# Patient Record
Sex: FEMALE | Race: WHITE | Employment: FULL TIME | URBAN - METROPOLITAN AREA
[De-identification: names, ages, dates, MRNs, and addresses within clinical notes are randomized per-mention and may not be internally consistent; named-entity substitution may affect disease eponyms.]

---

## 2018-04-25 ENCOUNTER — TELEPHONE (OUTPATIENT)
Dept: OTHER | Facility: CLINIC | Age: 53
End: 2018-04-25

## 2018-04-25 NOTE — TELEPHONE ENCOUNTER
4/25/2018    Call Regarding Onboarding are Choices    Attempt 1    Message on voicemail     Comments: 1 adult dep    Outreach   SV

## 2018-09-25 ENCOUNTER — ALLIED HEALTH/NURSE VISIT (OUTPATIENT)
Dept: NURSING | Facility: CLINIC | Age: 53
End: 2018-09-25
Payer: COMMERCIAL

## 2018-09-25 DIAGNOSIS — Z23 NEED FOR PROPHYLACTIC VACCINATION AND INOCULATION AGAINST INFLUENZA: Primary | ICD-10-CM

## 2018-09-25 PROCEDURE — 90682 RIV4 VACC RECOMBINANT DNA IM: CPT

## 2018-09-25 PROCEDURE — 90471 IMMUNIZATION ADMIN: CPT

## 2018-09-25 NOTE — NURSING NOTE
Prior to injection verified patient identity using patient's name and date of birth.  Due to injection administration, patient instructed to remain in clinic for 15 minutes  afterwards, and to report any adverse reaction to me immediately.  Meme Hernandez M.A.

## 2018-09-25 NOTE — PROGRESS NOTES

## 2018-09-25 NOTE — MR AVS SNAPSHOT
"              After Visit Summary   2018    Annemarie Prajapati    MRN: [id number]           Patient Information     Date Of Birth          1965        Visit Information        Provider Department      2018 10:00 AM CHERYL STATON/LPN [facility]        Today's Diagnoses     Need for prophylactic vaccination and inoculation against influenza    -  1       Follow-ups after your visit        Who to contact     If you have questions or need follow up information about today's clinic visit or your schedule please contact [facility] directly at [phone].  Normal or non-critical lab and imaging results will be communicated to you by iPerceptionshart, letter or phone within 4 business days after the clinic has received the results. If you do not hear from us within 7 days, please contact the clinic through Bitiumt or phone. If you have a critical or abnormal lab result, we will notify you by phone as soon as possible.  Submit refill requests through Unityware or call your pharmacy and they will forward the refill request to us. Please allow 3 business days for your refill to be completed.          Additional Information About Your Visit        MyChart Information     Unityware lets you send messages to your doctor, view your test results, renew your prescriptions, schedule appointments and more. To sign up, go to [email or web address] . Click on \"Log in\" on the left side of the screen, which will take you to the Welcome page. Then click on \"Sign up Now\" on the right side of the page.     You will be asked to enter the access code listed below, as well as some personal information. Please follow the directions to create your username and password.     Your access code is: [email or web address]  Expires: 2018 10:22 AM     Your access code will  in 90 days. If you need help or a new code, please call your [facility] or [phone].        Care EveryWhere ID     This is your Care " EveryWhere ID. This could be used by other organizations to access your [facility] medical records  [id number]         Blood Pressure from Last 3 Encounters:   10/28/16 122/76   10/21/16 129/79   05/19/14 136/72    Weight from Last 3 Encounters:   10/28/16 180 lb (81.6 kg)   10/21/16 186 lb (84.4 kg)   05/19/14 206 lb (93.4 kg)              We Performed the Following     FLU VACCINE, (RIV4) RECOMBINANT HA  , IM (FluBlok, egg free) [67550]- >18 YRS (FMG recommended  50-64 YRS)     Vaccine Administration, Initial [53998]        Primary Care Provider Office Phone # Fax #    Glenny Chou [phone]       [address] [address]        Equal Access to Services     NANCY BERNAL : Hadii moises todd hadasho Soomaali, waaxda luqadaha, qaybta kaalmada adeegyada, danitza donahue hayriley ennis . So [facility] [phone].    ATENCIÓN: Si habla español, tiene a ray disposición servicios gratuitos de asistencia lingüística. [place] al [phone].    We comply with applicable federal civil rights laws and [place] laws. We do not discriminate on the basis of race, color, national origin, age, disability, sex, sexual orientation, or gender identity.            Thank you!     Thank you for choosing [facility]  for your care. Our goal is always to provide you with excellent care. Hearing back from our patients is one way we can continue to improve our services. Please take a few minutes to complete the written survey that you may receive in the mail after your visit with us. Thank you!             Your Updated Medication List - Protect others around you: Learn how to safely use, store and throw away your medicines at www.disposemymeds.org.          This list is accurate as of 9/25/18 10:22 AM.  Always use your most recent med list.                   Brand Name Dispense Instructions for use Diagnosis    ACETAMINOPHEN 8 HOUR 650 MG 8 hour tablet    Generic drug:  acetaminophen     100 tablet    Take 325-650 mg by mouth every 6 hours as needed for pain.    S/P knee replacement       amitriptyline 10 MG tablet    ELAVIL     TAKE 1 TO 3 TABLETS BY MOUTH EVERY EVENING        aspirin-acetaminophen-caffeine 250-250-65 MG per tablet    EXCEDRIN MIGRAINE     Take 1 tablet by mouth every 6 hours as needed.        BUPROPION HCL PO           CELEBREX PO      Take 200 mg by mouth daily.        hydrocortisone 2.5 % cream     30 g    Apply  topically 3 times daily as needed (pruritic rash ).    Contact dermatitis       oxyCODONE-acetaminophen 5-325 MG per tablet    PERCOCET    15 tablet    Take 1-2 tablets by mouth every 4 hours as needed for pain        senna-docusate 8.6-50 MG per tablet    SENOKOT-S;PERICOLACE    60 tablet    Take 1-4 tablets by mouth 2 times daily.    S/P knee replacement       traMADol 50 MG tablet    ULTRAM    90 tablet    Take 1 tablet by mouth every 8 hours as needed.    S/P knee replacement       ZOFRAN PO      Take 8 mg by mouth every 8 hours as needed. Prn nausea and vomiting

## 2019-01-22 ENCOUNTER — THERAPY VISIT (OUTPATIENT)
Dept: PHYSICAL THERAPY | Facility: CLINIC | Age: 54
End: 2019-01-22
Payer: COMMERCIAL

## 2019-01-22 DIAGNOSIS — M54.50 LUMBOSACRAL PAIN: ICD-10-CM

## 2019-01-22 PROCEDURE — 97161 PT EVAL LOW COMPLEX 20 MIN: CPT | Mod: GP | Performed by: PHYSICAL THERAPIST

## 2019-01-22 PROCEDURE — 97110 THERAPEUTIC EXERCISES: CPT | Mod: GP | Performed by: PHYSICAL THERAPIST

## 2019-01-22 NOTE — PROGRESS NOTES
[facility] Initial Evaluation  Subjective:  History of lumbosacral pain for seventeen years secondary to SI instability during pregnancy. Pt has had intermittent pain since. 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 Pain has increased in the past month secondary to lifting, pushing and pulling during a rotation for her Occupational Therapy program. Pt referred by MD for physical therapy on 1-14-19      The history is provided by the patient. No  was used.   Annemarie Prajapati is a 53 year old female with a lumbar condition.  Condition occurred with:  Other reason.  Condition occurred: in the community and other.  This is a chronic condition     Patient reports pain:  Lumbar spine right and lumbar spine left.  Radiates to:  Gluteals right, knee right, lower leg right and thigh right (right L5 dermatome).  Pain is described as sharp and is intermittent and reported as 6/10.  Associated symptoms:  Loss of motion/stiffness and loss of strength. Pain is worse in the A.M..  Symptoms are exacerbated by sitting, standing, walking, lifting, carrying and bending and relieved by ice, heat, muscle relaxants and NSAID's (stretching).    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  Previous treatment includes chiropractic.  There was mild improvement following previous treatment.    Pertinent medical history includes:  Osteoarthritis and overweight.  Medical allergies: no.  Other surgeries include:  Orthopedic surgery (TKA x 2 , 2013).  Current medications:  Anti-depressants, muscle relaxants and sleep medication.    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  Primary job tasks include:  Prolonged sitting, prolonged standing and lifting.    Barriers include:  None as reported by the patient.    Red flags:  None as reported by  the patient.                        Objective:  Standing Alignment:        Lumbar deviations alignment: increased lordosis.                Flexibility/Screens:       Lower Extremity:  Decreased left lower extremity flexibility:Hip Flexors    Decreased right lower extremity flexibility:  Hip Flexors  Spine:  Decreased left spine flexibility:  Quadratus Lumborum    Decreased right spine flexibility:  Quadratus Lumborum             Lumbar/SI Evaluation  ROM:    AROM Lumbar:   Flexion:          Mod loss  Ext:                    Max loss pain    Side Bend:        Left:  Mod loss pain    Right:  Mod loss pain  Rotation:           Left:     Right:   Side Glide:        Left:     Right:         Strength: weak pelvic stabilizers and lower abdominals  Lumbar Myotomes:  normal            Lumbar DTR's:  normal        Lumbar Dermtomes:  normal                Neural Tension/Mobility:      Left side:SLR  negative.     Right side:   SLR  negative.   Lumbar Palpation:  Palpation (lumbar): point tenderness L3-L5 spinous processes and bilateral sacral sulci.            SI joint/Sacrum:    Pelvic base even, negative John Paul's                                                       General     ROS    Assessment/Plan:    Patient is a 53 year old female with lumbar and sacral complaints.    Patient has the following significant findings with corresponding treatment plan.                Diagnosis 1:  Lumbosacral pain  Pain -  hot/cold therapy, self management, education, home program and manual therapy as indicated  Decreased ROM/flexibility - manual therapy, therapeutic exercise, therapeutic activity and home program  Decreased strength - therapeutic exercise, therapeutic activities and home program    Therapy Evaluation Codes:   1) History comprised of:   Personal factors that impact the plan of care:      Time since onset of symptoms.    Comorbidity factors that impact the plan of care are:      Osteoarthritis and Overweight.     Medications  impacting care: Anti-depressant, Muscle relaxant and Sleep.  2) Examination of Body Systems comprised of:   Body structures and functions that impact the plan of care:      Lumbar spine and Sacral illiac joint.   Activity limitations that impact the plan of care are:      Bathing, Dressing, Lifting, Sitting, Standing and Sleeping.  3) Clinical presentation characteristics are:   Stable/Uncomplicated.  4) Decision-Making    Low complexity using standardized patient assessment instrument and/or measureable assessment of functional outcome.  Cumulative Therapy Evaluation is: Low complexity.    Previous and current functional limitations:  (See Goal Flow Sheet for this information)    Short term and Long term goals: (See Goal Flow Sheet for this information)     Communication ability:  Patient appears to be able to clearly communicate and understand verbal and written communication and follow directions correctly.  Treatment Explanation - The following has been discussed with the patient:   RX ordered/plan of care  Anticipated outcomes  Possible risks and side effects  This patient would benefit from PT intervention to resume normal activities.   Rehab potential is good.    Frequency:  1 X week, once daily  Duration:  for 6 weeks  Discharge Plan:  Achieve all LTG.  Independent in home treatment program.  Reach maximal therapeutic benefit.    Please refer to the daily flowsheet for treatment today, total treatment time and time spent performing 1:1 timed codes.

## 2019-01-22 NOTE — LETTER
[facility]  [address], [address]  [place] [address]  [phone]    2019    Re: Annemarie Prajapati   :   1965  MRN:  [id number]   REF[facility]ING PHYSIC[facility]    Date of Initial Evaluation:  19  Visits:  Rxs Used: 1  Reason for Referral:  Lumbosacral pain      [facility] Initial Evaluation  Subjective:  History of lumbosacral pain for seventeen years secondary to SI instability during pregnancy. Pt has had intermittent pain since. 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 Pain has increased in the past month secondary to lifting, pushing and pulling during a rotation for her Occupational Therapy program. Pt referred by MD for physical therapy on 19    The history is provided by the patient. No  was used.   Annemarie Prajapati is a 53 year old female with a lumbar condition.  Condition occurred with:  Other reason.  Condition occurred: in the community and other.  This is a chronic condition     Patient reports pain:  Lumbar spine right and lumbar spine left.  Radiates to:  Gluteals right, knee right, lower leg right and thigh right (right L5 dermatome).  Pain is described as sharp and is intermittent and reported as 6/10.  Associated symptoms:  Loss of motion/stiffness and loss of strength. Pain is worse in the A.M..  Symptoms are exacerbated by sitting, standing, walking, lifting, carrying and bending and relieved by ice, heat, muscle relaxants and NSAID's (stretching).    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  Previous treatment includes chiropractic.  There was mild improvement following previous treatment.    Pertinent medical history includes:  Osteoarthritis and overweight.  Medical allergies: no.  Other surgeries include:  Orthopedic surgery (TKA x 2013).   Current medications:  Anti-depressants, muscle relaxants and sleep medication.    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  Primary job tasks include:  Prolonged sitting, prolonged standing and lifting.    Barriers include:  None as reported by the patient.    Red flags:  None as reported by the patient.         Objective:  Standing Alignment:      Lumbar deviations alignment: increased lordosis.    Flexibility/Screens:     Lower Extremity:  Decreased left lower extremity flexibility:Hip Flexors      Re: Annemarie Prajapati   :   1965    Decreased right lower extremity flexibility:  Hip Flexors  Spine:  Decreased left spine flexibility:  Quadratus Lumborum    Decreased right spine flexibility:  Quadratus Lumborum       Lumbar/SI Evaluation  ROM:    AROM Lumbar:   Flexion:          Mod loss  Ext:                    Max loss pain    Side Bend:        Left:  Mod loss pain    Right:  Mod loss pain  Rotation:           Left:     Right:   Side Glide:        Left:     Right:         Strength: weak pelvic stabilizers and lower abdominals  Lumbar Myotomes:  normal    Lumbar DTR's:  normal    Lumbar Dermtomes:  normal    Neural Tension/Mobility:      Left side:SLR  negative.     Right side:   SLR  negative.   Lumbar Palpation:  Palpation (lumbar): point tenderness L3-L5 spinous processes and bilateral sacral sulci.    SI joint/Sacrum:    Pelvic base even, negative John Paul's    Assessment/Plan:    Patient is a 53 year old female with lumbar and sacral complaints.    Patient has the following significant findings with corresponding treatment plan.                Diagnosis 1:  Lumbosacral pain  Pain -  hot/cold therapy, self management, education, home program and manual therapy as indicated  Decreased ROM/flexibility - manual therapy, therapeutic exercise, therapeutic activity and home program  Decreased strength - therapeutic exercise, therapeutic activities and home  program    Therapy Evaluation Codes:   1) History comprised of:   Personal factors that impact the plan of care:      Time since onset of symptoms.    Comorbidity factors that impact the plan of care are:      Osteoarthritis and Overweight.     Medications impacting care: Anti-depressant, Muscle relaxant and Sleep.  2) Examination of Body Systems comprised of:   Body structures and functions that impact the plan of care:      Lumbar spine and Sacral illiac joint.   Activity limitations that impact the plan of care are:      Bathing, Dressing, Lifting, Sitting, Standing and Sleeping.  3) Clinical presentation characteristics are:   Stable/Uncomplicated.  Re: Annemarie Prajapati   :   1965    4) Decision-Making    Low complexity using standardized patient assessment instrument and/or measureable assessment of functional outcome.  Cumulative Therapy Evaluation is: Low complexity.    Previous and current functional limitations:  (See Goal Flow Sheet for this information)    Short term and Long term goals: (See Goal Flow Sheet for this information)     Communication ability:  Patient appears to be able to clearly communicate and understand verbal and written communication and follow directions correctly.  Treatment Explanation - The following has been discussed with the patient:   RX ordered/plan of care  Anticipated outcomes  Possible risks and side effects  This patient would benefit from PT intervention to resume normal activities.   Rehab potential is good.    Frequency:  1 X week, once daily  Duration:  for 6 weeks  Discharge Plan:  Achieve all LTG.  Independent in home treatment program.  Reach maximal therapeutic benefit.      Thank you for your referral.    INQUIRIES  Therapist: Arnold Krishna, PT  [facility]  [address], [address]  [place] [address]  Phone: [phone]  Fax: [phone]

## 2019-01-23 PROBLEM — M54.50 LUMBOSACRAL PAIN: Status: ACTIVE | Noted: 2019-01-23

## 2019-01-28 ENCOUNTER — THERAPY VISIT (OUTPATIENT)
Dept: PHYSICAL THERAPY | Facility: CLINIC | Age: 54
End: 2019-01-28
Payer: COMMERCIAL

## 2019-01-28 DIAGNOSIS — M54.50 LUMBOSACRAL PAIN: ICD-10-CM

## 2019-01-28 PROCEDURE — 97110 THERAPEUTIC EXERCISES: CPT | Mod: GP | Performed by: PHYSICAL THERAPIST

## 2019-01-28 PROCEDURE — 97112 NEUROMUSCULAR REEDUCATION: CPT | Mod: GP | Performed by: PHYSICAL THERAPIST

## 2019-02-04 ENCOUNTER — THERAPY VISIT (OUTPATIENT)
Dept: PHYSICAL THERAPY | Facility: CLINIC | Age: 54
End: 2019-02-04
Payer: COMMERCIAL

## 2019-02-04 DIAGNOSIS — M54.50 LUMBOSACRAL PAIN: ICD-10-CM

## 2019-02-04 PROCEDURE — 97110 THERAPEUTIC EXERCISES: CPT | Mod: GP | Performed by: PHYSICAL THERAPY ASSISTANT

## 2019-02-04 PROCEDURE — 97112 NEUROMUSCULAR REEDUCATION: CPT | Mod: GP | Performed by: PHYSICAL THERAPY ASSISTANT

## 2019-06-04 NOTE — PROGRESS NOTES
Subjective:  HPI                    Objective:  System    Physical Exam    General     ROS    Assessment/Plan:    DISCHARGE REPORT    Progress reporting period is from 1/22/19 to 2/4/19.      SUBJECTIVE  Subjective changes noted by patient:  Patient reports that the pain is less, but the numbness. The right leg has the numbness and has on occasion that the knee florencio.  Movement decreases the pain.in the low back.   Current pain level is .  Current Pain level: 1/10(can go to a 4/10)    Previous pain level was:   Initial Pain level: 6/10   Changes in function:  Yes (See Goal flowsheet attached for changes in current functional level) Changes in function: Yes, see goal flow sheet for change in function   Adverse reaction to treatment or activity: None     OBJECTIVE  Changes noted in objective findings:  Patient has failed to return to therapy so current objective findings are unknown.  Objective: Patient is able to be in a supine position for the engaging the TA's. Recruitment of the TA in sitting and standing is more challenging and needs more cueing.

## 2019-06-18 PROBLEM — M54.50 LUMBOSACRAL PAIN: Status: RESOLVED | Noted: 2019-01-23 | Resolved: 2019-06-18

## 2019-06-18 NOTE — PROGRESS NOTES
Subjective:  HPI                    Objective:  System    Physical Exam    General     ROS    Assessment/Plan:    ASSESSMENT/PLAN  Updated problem list and treatment plan: Diagnosis 1:  Lumbosacral pain  Pain -  self management, education and home program  Decreased ROM/flexibility - therapeutic exercise, therapeutic activity and home program  Decreased strength - therapeutic exercise, therapeutic activities and home program  Progress toward STG/LTGs have been made:  Yes,   Assessment of Progress: The patient's condition has potential to improve.  Self Management Plans:  Patient has been instructed in a home treatment program.  I have re-evaluated this patient and find that the nature, scope, duration and intensity of the therapy is appropriate for the medical condition of the patient.  Annemarie continues to require the following intervention to meet STG and LT's:  PT    Recommendations:  Pt has not returned for treatment since 2-4-19. Pt discharged at this time.      Please refer to the daily flowsheet for treatment today, total treatment time and time spent performing 1:1 timed codes.

## 2020-04-12 ENCOUNTER — APPOINTMENT (OUTPATIENT)
Dept: GENERAL RADIOLOGY | Facility: CLINIC | Age: 55
DRG: 485 | End: 2020-04-12
Attending: PHYSICIAN ASSISTANT
Payer: COMMERCIAL

## 2020-04-12 ENCOUNTER — ANESTHESIA EVENT (OUTPATIENT)
Dept: SURGERY | Facility: CLINIC | Age: 55
DRG: 485 | End: 2020-04-12
Payer: COMMERCIAL

## 2020-04-12 ENCOUNTER — APPOINTMENT (OUTPATIENT)
Dept: GENERAL RADIOLOGY | Facility: CLINIC | Age: 55
DRG: 485 | End: 2020-04-12
Attending: INTERNAL MEDICINE
Payer: COMMERCIAL

## 2020-04-12 ENCOUNTER — HOSPITAL ENCOUNTER (INPATIENT)
Facility: CLINIC | Age: 55
LOS: 4 days | Discharge: HOME HEALTH | DRG: 485 | End: 2020-04-16
Attending: PHYSICIAN ASSISTANT | Admitting: INTERNAL MEDICINE
Payer: COMMERCIAL

## 2020-04-12 ENCOUNTER — APPOINTMENT (OUTPATIENT)
Dept: ULTRASOUND IMAGING | Facility: CLINIC | Age: 55
DRG: 485 | End: 2020-04-12
Attending: PHYSICIAN ASSISTANT
Payer: COMMERCIAL

## 2020-04-12 DIAGNOSIS — A41.9 SEPSIS (H): ICD-10-CM

## 2020-04-12 DIAGNOSIS — M00.262 STREPTOCOCCAL ARTHRITIS OF LEFT KNEE (H): Primary | ICD-10-CM

## 2020-04-12 DIAGNOSIS — M25.562 ACUTE PAIN OF LEFT KNEE: ICD-10-CM

## 2020-04-12 LAB
ALBUMIN UR-MCNC: NEGATIVE MG/DL
ANION GAP SERPL CALCULATED.3IONS-SCNC: 4 MMOL/L (ref 3–14)
APPEARANCE FLD: NORMAL
APPEARANCE UR: CLEAR
BASOPHILS # BLD AUTO: 0 10E9/L (ref 0–0.2)
BASOPHILS NFR BLD AUTO: 0.1 %
BILIRUB UR QL STRIP: NEGATIVE
BUN SERPL-MCNC: 11 MG/DL (ref 7–30)
CALCIUM SERPL-MCNC: 8.7 MG/DL (ref 8.5–10.1)
CHLORIDE SERPL-SCNC: 106 MMOL/L (ref 94–109)
CO2 SERPL-SCNC: 26 MMOL/L (ref 20–32)
COLOR FLD: YELLOW
COLOR UR AUTO: ABNORMAL
CREAT SERPL-MCNC: 0.71 MG/DL (ref 0.52–1.04)
CRP SERPL-MCNC: 163 MG/L (ref 0–8)
CRYSTALS SNV MICRO: NORMAL
DIFFERENTIAL METHOD BLD: ABNORMAL
EOSINOPHIL # BLD AUTO: 0 10E9/L (ref 0–0.7)
EOSINOPHIL NFR BLD AUTO: 0.1 %
ERYTHROCYTE [DISTWIDTH] IN BLOOD BY AUTOMATED COUNT: 13.5 % (ref 10–15)
ERYTHROCYTE [SEDIMENTATION RATE] IN BLOOD BY WESTERGREN METHOD: 18 MM/H (ref 0–30)
GFR SERPL CREATININE-BSD FRML MDRD: >90 ML/MIN/{1.73_M2}
GLUCOSE FLD-MCNC: 2 MG/DL
GLUCOSE SERPL-MCNC: 147 MG/DL (ref 70–99)
GLUCOSE UR STRIP-MCNC: NEGATIVE MG/DL
GRAM STN SPEC: ABNORMAL
HCT VFR BLD AUTO: 41.7 % (ref 35–47)
HGB BLD-MCNC: 13.6 G/DL (ref 11.7–15.7)
HGB UR QL STRIP: ABNORMAL
IMM GRANULOCYTES # BLD: 0.1 10E9/L (ref 0–0.4)
IMM GRANULOCYTES NFR BLD: 0.5 %
KETONES UR STRIP-MCNC: 20 MG/DL
LACTATE BLD-SCNC: 0.6 MMOL/L (ref 0.7–2)
LACTATE BLD-SCNC: 2.4 MMOL/L (ref 0.7–2)
LEUKOCYTE ESTERASE UR QL STRIP: ABNORMAL
LYMPHOCYTES # BLD AUTO: 1.7 10E9/L (ref 0.8–5.3)
LYMPHOCYTES NFR BLD AUTO: 11.2 %
LYMPHOCYTES NFR FLD MANUAL: 3 %
MCH RBC QN AUTO: 30.2 PG (ref 26.5–33)
MCHC RBC AUTO-ENTMCNC: 32.6 G/DL (ref 31.5–36.5)
MCV RBC AUTO: 93 FL (ref 78–100)
MONOCYTES # BLD AUTO: 1.1 10E9/L (ref 0–1.3)
MONOCYTES NFR BLD AUTO: 7.3 %
MONOS+MACROS NFR FLD MANUAL: 1 %
NEUTROPHILS # BLD AUTO: 11.9 10E9/L (ref 1.6–8.3)
NEUTROPHILS NFR BLD AUTO: 80.8 %
NEUTS BAND NFR FLD MANUAL: 96 %
NITRATE UR QL: NEGATIVE
NRBC # BLD AUTO: 0 10*3/UL
NRBC BLD AUTO-RTO: 0 /100
PH UR STRIP: 6 PH (ref 5–7)
PLATELET # BLD AUTO: 268 10E9/L (ref 150–450)
POTASSIUM SERPL-SCNC: 3.8 MMOL/L (ref 3.4–5.3)
PROT FLD-MCNC: 4.4 G/DL
RBC # BLD AUTO: 4.5 10E12/L (ref 3.8–5.2)
RBC #/AREA URNS AUTO: 19 /HPF (ref 0–2)
SODIUM SERPL-SCNC: 136 MMOL/L (ref 133–144)
SOURCE: ABNORMAL
SP GR UR STRIP: 1.02 (ref 1–1.03)
SPECIMEN SOURCE FLD: NORMAL
SPECIMEN SOURCE SNV: NORMAL
SPECIMEN SOURCE: ABNORMAL
SQUAMOUS #/AREA URNS AUTO: 7 /HPF (ref 0–1)
TSH SERPL DL<=0.005 MIU/L-ACNC: 1.12 MU/L (ref 0.4–4)
UROBILINOGEN UR STRIP-MCNC: NORMAL MG/DL (ref 0–2)
WBC # BLD AUTO: 14.8 10E9/L (ref 4–11)
WBC # FLD AUTO: NORMAL /UL
WBC #/AREA URNS AUTO: 22 /HPF (ref 0–5)

## 2020-04-12 PROCEDURE — 86140 C-REACTIVE PROTEIN: CPT | Performed by: PHYSICIAN ASSISTANT

## 2020-04-12 PROCEDURE — 25000128 H RX IP 250 OP 636: Performed by: EMERGENCY MEDICINE

## 2020-04-12 PROCEDURE — 71045 X-RAY EXAM CHEST 1 VIEW: CPT

## 2020-04-12 PROCEDURE — 25800030 ZZH RX IP 258 OP 636: Performed by: INTERNAL MEDICINE

## 2020-04-12 PROCEDURE — 84157 ASSAY OF PROTEIN OTHER: CPT | Performed by: EMERGENCY MEDICINE

## 2020-04-12 PROCEDURE — 96365 THER/PROPH/DIAG IV INF INIT: CPT

## 2020-04-12 PROCEDURE — 83605 ASSAY OF LACTIC ACID: CPT | Performed by: PHYSICIAN ASSISTANT

## 2020-04-12 PROCEDURE — 96375 TX/PRO/DX INJ NEW DRUG ADDON: CPT

## 2020-04-12 PROCEDURE — 12000000 ZZH R&B MED SURG/OB

## 2020-04-12 PROCEDURE — 36415 COLL VENOUS BLD VENIPUNCTURE: CPT | Performed by: PHYSICIAN ASSISTANT

## 2020-04-12 PROCEDURE — 87086 URINE CULTURE/COLONY COUNT: CPT | Performed by: INTERNAL MEDICINE

## 2020-04-12 PROCEDURE — 99285 EMERGENCY DEPT VISIT HI MDM: CPT | Mod: 25

## 2020-04-12 PROCEDURE — 99222 1ST HOSP IP/OBS MODERATE 55: CPT | Mod: AI | Performed by: INTERNAL MEDICINE

## 2020-04-12 PROCEDURE — 93005 ELECTROCARDIOGRAM TRACING: CPT

## 2020-04-12 PROCEDURE — 84443 ASSAY THYROID STIM HORMONE: CPT | Performed by: PHYSICIAN ASSISTANT

## 2020-04-12 PROCEDURE — 0S9D3ZX DRAINAGE OF LEFT KNEE JOINT, PERCUTANEOUS APPROACH, DIAGNOSTIC: ICD-10-PCS | Performed by: PHYSICIAN ASSISTANT

## 2020-04-12 PROCEDURE — 25000132 ZZH RX MED GY IP 250 OP 250 PS 637: Performed by: INTERNAL MEDICINE

## 2020-04-12 PROCEDURE — 25000128 H RX IP 250 OP 636: Performed by: PHYSICIAN ASSISTANT

## 2020-04-12 PROCEDURE — 87186 SC STD MICRODIL/AGAR DIL: CPT | Performed by: EMERGENCY MEDICINE

## 2020-04-12 PROCEDURE — 85025 COMPLETE CBC W/AUTO DIFF WBC: CPT | Performed by: PHYSICIAN ASSISTANT

## 2020-04-12 PROCEDURE — 87077 CULTURE AEROBIC IDENTIFY: CPT | Performed by: EMERGENCY MEDICINE

## 2020-04-12 PROCEDURE — 81001 URINALYSIS AUTO W/SCOPE: CPT | Performed by: INTERNAL MEDICINE

## 2020-04-12 PROCEDURE — 87205 SMEAR GRAM STAIN: CPT | Performed by: EMERGENCY MEDICINE

## 2020-04-12 PROCEDURE — 96376 TX/PRO/DX INJ SAME DRUG ADON: CPT

## 2020-04-12 PROCEDURE — 25000128 H RX IP 250 OP 636

## 2020-04-12 PROCEDURE — 89060 EXAM SYNOVIAL FLUID CRYSTALS: CPT | Performed by: EMERGENCY MEDICINE

## 2020-04-12 PROCEDURE — 25000128 H RX IP 250 OP 636: Performed by: INTERNAL MEDICINE

## 2020-04-12 PROCEDURE — 87040 BLOOD CULTURE FOR BACTERIA: CPT | Performed by: PHYSICIAN ASSISTANT

## 2020-04-12 PROCEDURE — 89051 BODY FLUID CELL COUNT: CPT | Performed by: EMERGENCY MEDICINE

## 2020-04-12 PROCEDURE — 25800030 ZZH RX IP 258 OP 636

## 2020-04-12 PROCEDURE — 73562 X-RAY EXAM OF KNEE 3: CPT | Mod: LT

## 2020-04-12 PROCEDURE — 82945 GLUCOSE OTHER FLUID: CPT | Performed by: EMERGENCY MEDICINE

## 2020-04-12 PROCEDURE — 85652 RBC SED RATE AUTOMATED: CPT | Performed by: PHYSICIAN ASSISTANT

## 2020-04-12 PROCEDURE — 80048 BASIC METABOLIC PNL TOTAL CA: CPT | Performed by: PHYSICIAN ASSISTANT

## 2020-04-12 PROCEDURE — 96361 HYDRATE IV INFUSION ADD-ON: CPT

## 2020-04-12 PROCEDURE — 93971 EXTREMITY STUDY: CPT | Mod: LT

## 2020-04-12 PROCEDURE — 25800030 ZZH RX IP 258 OP 636: Performed by: PHYSICIAN ASSISTANT

## 2020-04-12 PROCEDURE — 20610 DRAIN/INJ JOINT/BURSA W/O US: CPT

## 2020-04-12 PROCEDURE — 87070 CULTURE OTHR SPECIMN AEROBIC: CPT | Performed by: EMERGENCY MEDICINE

## 2020-04-12 RX ORDER — NALOXONE HYDROCHLORIDE 0.4 MG/ML
.1-.4 INJECTION, SOLUTION INTRAMUSCULAR; INTRAVENOUS; SUBCUTANEOUS
Status: DISCONTINUED | OUTPATIENT
Start: 2020-04-12 | End: 2020-04-16 | Stop reason: HOSPADM

## 2020-04-12 RX ORDER — CEFAZOLIN SODIUM 1 G/50ML
1 INJECTION, SOLUTION INTRAVENOUS SEE ADMIN INSTRUCTIONS
Status: DISCONTINUED | OUTPATIENT
Start: 2020-04-12 | End: 2020-04-12

## 2020-04-12 RX ORDER — ACETAMINOPHEN 650 MG/1
650 SUPPOSITORY RECTAL EVERY 4 HOURS PRN
Status: DISCONTINUED | OUTPATIENT
Start: 2020-04-12 | End: 2020-04-16 | Stop reason: HOSPADM

## 2020-04-12 RX ORDER — POLYETHYLENE GLYCOL 3350 17 G/17G
17 POWDER, FOR SOLUTION ORAL DAILY PRN
Status: DISCONTINUED | OUTPATIENT
Start: 2020-04-12 | End: 2020-04-16 | Stop reason: HOSPADM

## 2020-04-12 RX ORDER — LIDOCAINE HYDROCHLORIDE 10 MG/ML
INJECTION, SOLUTION INFILTRATION; PERINEURAL
Status: DISCONTINUED
Start: 2020-04-12 | End: 2020-04-12 | Stop reason: HOSPADM

## 2020-04-12 RX ORDER — ONDANSETRON 4 MG/1
4 TABLET, ORALLY DISINTEGRATING ORAL EVERY 6 HOURS PRN
Status: DISCONTINUED | OUTPATIENT
Start: 2020-04-12 | End: 2020-04-14

## 2020-04-12 RX ORDER — BUPROPION HYDROCHLORIDE 150 MG/1
300 TABLET ORAL EVERY EVENING
Status: DISCONTINUED | OUTPATIENT
Start: 2020-04-12 | End: 2020-04-16 | Stop reason: HOSPADM

## 2020-04-12 RX ORDER — NAPROXEN 250 MG/1
250 TABLET ORAL 2 TIMES DAILY PRN
Status: ON HOLD | COMMUNITY
End: 2020-04-16

## 2020-04-12 RX ORDER — LIDOCAINE 40 MG/G
CREAM TOPICAL
Status: DISCONTINUED | OUTPATIENT
Start: 2020-04-12 | End: 2020-04-16 | Stop reason: HOSPADM

## 2020-04-12 RX ORDER — ONDANSETRON 2 MG/ML
4 INJECTION INTRAMUSCULAR; INTRAVENOUS EVERY 6 HOURS PRN
Status: DISCONTINUED | OUTPATIENT
Start: 2020-04-12 | End: 2020-04-14

## 2020-04-12 RX ORDER — LIDOCAINE 40 MG/G
CREAM TOPICAL
Status: DISCONTINUED | OUTPATIENT
Start: 2020-04-12 | End: 2020-04-14

## 2020-04-12 RX ORDER — BUPROPION HYDROCHLORIDE 300 MG/1
300 TABLET ORAL EVERY EVENING
COMMUNITY

## 2020-04-12 RX ORDER — AMOXICILLIN 250 MG
1 CAPSULE ORAL 2 TIMES DAILY PRN
Status: DISCONTINUED | OUTPATIENT
Start: 2020-04-12 | End: 2020-04-16 | Stop reason: HOSPADM

## 2020-04-12 RX ORDER — TRAZODONE HYDROCHLORIDE 50 MG/1
50 TABLET, FILM COATED ORAL
Status: DISCONTINUED | OUTPATIENT
Start: 2020-04-12 | End: 2020-04-16 | Stop reason: HOSPADM

## 2020-04-12 RX ORDER — TRAZODONE HYDROCHLORIDE 50 MG/1
50 TABLET, FILM COATED ORAL
COMMUNITY

## 2020-04-12 RX ORDER — OXYCODONE HYDROCHLORIDE 5 MG/1
5-10 TABLET ORAL EVERY 4 HOURS PRN
Status: DISCONTINUED | OUTPATIENT
Start: 2020-04-12 | End: 2020-04-14

## 2020-04-12 RX ORDER — HYDROMORPHONE HYDROCHLORIDE 1 MG/ML
0.5 INJECTION, SOLUTION INTRAMUSCULAR; INTRAVENOUS; SUBCUTANEOUS
Status: COMPLETED | OUTPATIENT
Start: 2020-04-12 | End: 2020-04-12

## 2020-04-12 RX ORDER — SODIUM CHLORIDE 9 MG/ML
1000 INJECTION, SOLUTION INTRAVENOUS CONTINUOUS
Status: DISCONTINUED | OUTPATIENT
Start: 2020-04-12 | End: 2020-04-12

## 2020-04-12 RX ORDER — NAPROXEN 250 MG/1
250 TABLET ORAL 2 TIMES DAILY WITH MEALS
COMMUNITY
End: 2020-04-12

## 2020-04-12 RX ORDER — NITROGLYCERIN 0.4 MG/1
0.4 TABLET SUBLINGUAL EVERY 5 MIN PRN
Status: DISCONTINUED | OUTPATIENT
Start: 2020-04-12 | End: 2020-04-16 | Stop reason: HOSPADM

## 2020-04-12 RX ORDER — CYCLOBENZAPRINE HCL 10 MG
10 TABLET ORAL 3 TIMES DAILY PRN
COMMUNITY

## 2020-04-12 RX ORDER — AMOXICILLIN 250 MG
2 CAPSULE ORAL 2 TIMES DAILY PRN
Status: DISCONTINUED | OUTPATIENT
Start: 2020-04-12 | End: 2020-04-16 | Stop reason: HOSPADM

## 2020-04-12 RX ORDER — CEFAZOLIN SODIUM 1 G/50ML
1 INJECTION, SOLUTION INTRAVENOUS EVERY 8 HOURS
Status: DISCONTINUED | OUTPATIENT
Start: 2020-04-12 | End: 2020-04-13

## 2020-04-12 RX ORDER — CYCLOBENZAPRINE HCL 10 MG
10 TABLET ORAL 3 TIMES DAILY PRN
Status: DISCONTINUED | OUTPATIENT
Start: 2020-04-12 | End: 2020-04-16 | Stop reason: HOSPADM

## 2020-04-12 RX ORDER — BISACODYL 10 MG
10 SUPPOSITORY, RECTAL RECTAL DAILY PRN
Status: DISCONTINUED | OUTPATIENT
Start: 2020-04-12 | End: 2020-04-16 | Stop reason: HOSPADM

## 2020-04-12 RX ORDER — HYDROMORPHONE HYDROCHLORIDE 1 MG/ML
.3-.5 INJECTION, SOLUTION INTRAMUSCULAR; INTRAVENOUS; SUBCUTANEOUS
Status: DISCONTINUED | OUTPATIENT
Start: 2020-04-12 | End: 2020-04-16 | Stop reason: HOSPADM

## 2020-04-12 RX ORDER — CEFAZOLIN SODIUM 1 G/50ML
2000 SOLUTION INTRAVENOUS ONCE
Status: COMPLETED | OUTPATIENT
Start: 2020-04-12 | End: 2020-04-12

## 2020-04-12 RX ORDER — CEFAZOLIN SODIUM 1 G/50ML
2000 SOLUTION INTRAVENOUS EVERY 12 HOURS
Status: DISCONTINUED | OUTPATIENT
Start: 2020-04-13 | End: 2020-04-15

## 2020-04-12 RX ORDER — HYDROMORPHONE HYDROCHLORIDE 1 MG/ML
0.5 INJECTION, SOLUTION INTRAMUSCULAR; INTRAVENOUS; SUBCUTANEOUS ONCE
Status: COMPLETED | OUTPATIENT
Start: 2020-04-12 | End: 2020-04-12

## 2020-04-12 RX ORDER — CEFAZOLIN SODIUM 2 G/100ML
2 INJECTION, SOLUTION INTRAVENOUS
Status: DISCONTINUED | OUTPATIENT
Start: 2020-04-12 | End: 2020-04-12

## 2020-04-12 RX ORDER — ACETAMINOPHEN 325 MG/1
650 TABLET ORAL EVERY 4 HOURS PRN
Status: DISCONTINUED | OUTPATIENT
Start: 2020-04-12 | End: 2020-04-14

## 2020-04-12 RX ORDER — SODIUM CHLORIDE 9 MG/ML
INJECTION, SOLUTION INTRAVENOUS CONTINUOUS
Status: DISCONTINUED | OUTPATIENT
Start: 2020-04-12 | End: 2020-04-14

## 2020-04-12 RX ADMIN — HYDROMORPHONE HYDROCHLORIDE 0.5 MG: 1 INJECTION, SOLUTION INTRAMUSCULAR; INTRAVENOUS; SUBCUTANEOUS at 16:30

## 2020-04-12 RX ADMIN — BUPROPION HYDROCHLORIDE 300 MG: 150 TABLET, EXTENDED RELEASE ORAL at 19:59

## 2020-04-12 RX ADMIN — SODIUM CHLORIDE 1000 ML: 9 INJECTION, SOLUTION INTRAVENOUS at 12:13

## 2020-04-12 RX ADMIN — VANCOMYCIN HYDROCHLORIDE 2000 MG: 10 INJECTION, POWDER, LYOPHILIZED, FOR SOLUTION INTRAVENOUS at 15:01

## 2020-04-12 RX ADMIN — SODIUM CHLORIDE: 9 INJECTION, SOLUTION INTRAVENOUS at 17:31

## 2020-04-12 RX ADMIN — HYDROMORPHONE HYDROCHLORIDE 0.5 MG: 1 INJECTION, SOLUTION INTRAMUSCULAR; INTRAVENOUS; SUBCUTANEOUS at 18:55

## 2020-04-12 RX ADMIN — CEFAZOLIN SODIUM 1 G: 1 INJECTION, SOLUTION INTRAVENOUS at 18:00

## 2020-04-12 RX ADMIN — HYDROMORPHONE HYDROCHLORIDE 0.5 MG: 1 INJECTION, SOLUTION INTRAMUSCULAR; INTRAVENOUS; SUBCUTANEOUS at 12:13

## 2020-04-12 RX ADMIN — TAZOBACTAM SODIUM AND PIPERACILLIN SODIUM 4.5 G: 500; 4 INJECTION, SOLUTION INTRAVENOUS at 14:22

## 2020-04-12 RX ADMIN — HYDROMORPHONE HYDROCHLORIDE 0.5 MG: 1 INJECTION, SOLUTION INTRAMUSCULAR; INTRAVENOUS; SUBCUTANEOUS at 21:34

## 2020-04-12 RX ADMIN — SODIUM CHLORIDE 1000 ML: 9 INJECTION, SOLUTION INTRAVENOUS at 14:22

## 2020-04-12 ASSESSMENT — MIFFLIN-ST. JEOR: SCORE: 1630.54

## 2020-04-12 ASSESSMENT — ENCOUNTER SYMPTOMS
ARTHRALGIAS: 1
VOMITING: 0
WEAKNESS: 1
CHILLS: 0
LIGHT-HEADEDNESS: 1
FEVER: 0

## 2020-04-12 ASSESSMENT — ACTIVITIES OF DAILY LIVING (ADL): ADLS_ACUITY_SCORE: 13

## 2020-04-12 NOTE — CONSULTS
Consult Date:  2020      CHIEF COMPLAINT:  Left knee pain and swelling.      HISTORY OF PRESENT ILLNESS:  Jimmie Park has requested orthopedic evaluation in the Emergency Room for this 55-year-old female, 7 years status post bilateral total knee replacements by Dr. Michael Trimble with an excellent result.  She has had no problems whatsoever until 2 days ago when she developed pain and swelling rather abruptly without injury, prior infection, general illness, or other associated problems.  She is otherwise healthy and works at [employer].      PAST MEDICAL, ET CETERA:  See form.      PAST MEDICAL AND SURGICAL HISTORY:  Please see the chart.      MEDICATIONS AND ALLERGIES:  Please see the chart.        She lives locally.      REVIEW OF SYSTEMS:  Negative.      PHYSICAL EXAMINATION:  Exam shows a moderate knee effusion.  She has pain with passive motion beyond 20-80 degrees.  She has an intact leg lift.  Skin is intact.  There is no redness or warmth.  Perfusion and sensation are normal.  Her respirations are normal, and her general appearance and affect are normal.      RADIOGRAPHIC EVALUATION:  X-ray showed good prosthesis alignment and position with no complications.      IMPRESSION:  Acutely infected left total knee arthroplasty.      RECOMMENDATIONS:  An aspiration has already been done by Dr. Park with 10 mL of cloudy fluid.  This is sent for Gram stain, cell count and culture.  I will speak with the primary orthopedist as to who will assume the patient's care, but I would plan a polyethylene exchange, irrigation and debridement and component retention for a suspected acute infection in a healthy host.  All questions were answered from the patient.  Surgery planned tomorrow.         MIGDALIA SOLORZANO MD             D: 2020   T: 2020   MT:       Name:     COCO ZUÑIGA   MRN:      [id number]        Account:       [id number]   :      1965           Consult Date:  2020       Document: [id number]       cc: Jimmie Park PA-C

## 2020-04-12 NOTE — ED PROVIDER NOTES
History     Chief Complaint:  Knee Pain    HPI   Annemarie Prajapati is a 55 year old female with a history of bilateral knee pain 7 years post bilateral replacement who presents to the emergency department for evaluation of knee pain. The patient reports that 2 days ago she was sitting at her desk when she noticed that she was having left knee pain and swelling. The patient states that she was also having knee stiffness and that her pain radiates up to her hip. The area is also warm to the touch according to the patient. The patient has a history of bilateral knee replacements 7 years ago, which was performed at [facility]. She visited [facility] yesterday and had a knee x-ray done, that the patient states was unremarkable. She set up a follow up appointment for tomorrow, but she is experiencing increasing pain which is currently 8/10. She was told following her appointment that she will likely need blood work done and may need a knee aspiration. The patient also mentions that she has been having left leg weakness, lightheadedness, and believes that she had a syncopal episode 2 days ago. At that time she was going to sit down and woke up later beside a chair on her laminate wendy. She also mentions that she has had increased ankle swelling over the last 6 months. She denies any fevers, chills, vomiting or chest pain.     Allergies:  No Known Drug Allergies    Medications:    Elavil  Excedrin Migraine  Bupropion  Celebrex  Zofran  Percocet  Senokot  Ultram  Desyrel    Past Medical History:    Depression  Mononucleosis  Bilateral knee pain  Migraine  Varicose vein lower extremity  Insomnia  Obesity     Past Surgical History:    Bilateral knee arthroplasty  Tooth extraction    Family History:    Mother: diabetes, coronary artery disease, thyroid disease  Father: coronary artery disease, hypertension, hyperlipidemia, prostate cancer    Social History:  The patient presents alone.  Smoking Status: Never  Smokeless Tobacco: Not on  "file  Alcohol Use: Yes  Drug Use: No  Marital Status:       Review of Systems   Constitutional: Negative for chills and fever.   Cardiovascular: Negative for chest pain.   Gastrointestinal: Negative for vomiting.   Musculoskeletal: Positive for arthralgias.   Neurological: Positive for syncope, weakness and light-headedness.   All other systems reviewed and are negative.      Physical Exam   Vitals:  Patient Vitals for the past 24 hrs:   BP Temp Temp src Pulse Heart Rate Resp SpO2 Height Weight   04/12/20 1215 -- -- -- -- -- -- 97 % -- --   04/12/20 1200 131/75 -- -- 103 -- -- 95 % -- --   04/12/20 1152 -- 99.6  F (37.6  C) Oral 102 102 18 97 % 1.702 m (5' 7\") 99.8 kg (220 lb)     Physical Exam  Constitutional: Pleasant. Cooperative.   Eyes: Pupils equally round and reactive  HENT: Head is normal in appearance. Oropharynx is normal with moist mucus membranes.  Cardiovascular: Regular rate and rhythm and without murmurs.  Respiratory: Normal respiratory effort, lungs are clear bilaterally.  GI: Abdomen is soft, non-tender, non-distended. No guarding, rebound, or rigidity.  Musculoskeletal: L knee warm with swelling, no overlying erythema, slightly TTP, especially posteriorly, slightly decreased ROM secondary to pain. L lower extremity otherwise unremarkable. 2+ PT pulses bilaterally. R lower extremity unremarkable.  Skin: Normal, without rash.  Neurologic: Cranial nerves grossly intact, normal cognition, no focal deficits. Alert and oriented x 3. Sensation intact distally.  Psychiatric: Normal affect.  Nursing notes and vital signs reviewed.      Emergency Department Course     Imaging:  Radiographic findings were communicated with the patient who voiced understanding of the findings.    XR Knee Left 3 Views  Left total knee arthroplasty with patellar resurfacing  projects in expected position, unchanged. No acute displaced  periprosthetic fracture. Small to moderate-sized knee joint effusion.  Small amount of " lucency is seen about the patellar resurfacing  component, which could be seen with loosening. Small to moderate-sized  knee joint effusion. Mild anterior knee soft tissue swelling. Midline  skin staples and surgical drain removed. Resolution of soft tissue and  intra-articular gas.  Reading per radiology.    US Lower Extremity Venous Duplex Left  1.  No deep venous thrombosis in the left lower extremity.  2.  Baker cyst identified on the left.  Reading per radiology.    Laboratory:  CBC: WBC 14.8 (H) o/w WNL. (HGB 13.6, )   BMP: Glucose 147 (H) o/w AWNL (Creatinine 0.71)    Lactic Acid: 2.4 (H)  Lactic Acid: 0.6 (L)    CRP Inflammation: 163.0 (H)  Erythrocyte Sedimentation Rate: 18    Protein Fluid: 4.4, Left Knee  Glucose Fluid 2, Left Knee  Gram Stain: No organism seen, Left Knee    Blood Culture x2: Pending  Cell count with Differential: Pending  Fluid Culture Aerobic Bacterial: Pending  Crystal ID Synovial Fluid: Pending    Procedures:       Arthrocentesis     PERFORMED BY: Jimmie Elias APC.    SITE: Left knee.    INDICATION:  Swelling, warmth.    CONSENT: Obtained from patient.     ANESTHESIA: 1% lidocaine, total of 3 ml    NOTE:  Using sterile technique the site was prepped with above anesthetic. The joint was entered and 10 cc's of turbid colored fluid was withdrawn and sent for laboratory analysis, results above.     COMPLICATIONS: Patient tolerated the procedure well with no immediate complications.    POST-PROCEDURE INSTRUCTIONS: The patient is asked to continue to rest the joint for a few more days before resuming regular activities.  It may be more painful for the first 1-2 days.  Watch for fever, or increased swelling or persistent pain in the joint.      Interventions:  1213 NS, 1 L, IV bolus  1213 Dilaudid 0.5 mg IV  1422 Zosyn 4.5 g IV  1501 Vancocin 2000 mg IV     Emergency Department Course:  Past medical records, nursing notes, and vitals reviewed.    1205 I performed an exam of the  patient as documented above.     [place] Head CT Rule  (calculator)  Background  Assesses need for head imaging in acute trauma  Only validated in adults with GCS 13-15 with witnessed LOC, amnesia to head injury or confusion  Data  55 year old  High Risk Criteria (major criteria)   Of 5 possible items  NEGATIVE    Moderate Risk Criteria (minor criteria)   Of 3 possible items  NEGATIVE    Interpretation  No indications for head imaging    IV was inserted and blood was drawn for laboratory testing, results above.  The patient was sent for a left knee XR and lower extremity US while in the emergency department, results above.     1212 I rechecked and updated the patient.     1230 I dicussed the patient with Dr. Lozano, who agreed to staff the patient.    1244 I spoke with Dr. Luther, orthopedics, regarding the patient.     1333 I rechecked and updated the patient. I performed the arthrocentesis at this time.    1430 I rechecked the patient and discussed the results of her workup thus far. I spoke with Dr. Luther, orthopedics, who came to the emergency hospital to visit the patient.    1452 I spoke with Dr. Stanley, hospitalist, who agreed to admit the patient.     Findings and plan explained to the Patient who consents to admission. Discussed the patient with Dr. Stanley, who will admit the patient to a medical bed for further monitoring, evaluation, and treatment.    I personally reviewed the laboratory results with the Patient and answered all related questions prior to admission.    Impression & Plan      CMS Diagnoses:   The patient has signs of Severe Sepsis REMINDER: Please use septic shock SmartPhrase for Lactate > 4 or a patient requiring vasopressors after initial fluid bolus (meaning persistent hypotension)      If one the following conditions is present, a 30 mL/kg bolus is recommended as part of the 6 hour bundle (IBW can be used for BMI >30, or document refusal/contraindication):      1.   Initial  hypotension  defined as 2 bps < 90 or map < 65 in the 6hrs before or 6hrs after time zero.     2.  Lactate >4.     The patient has signs of Severe Sepsis as evidenced by:    1. 2 SIRS criteria, AND  2. Suspected infection, AND   3. Organ dysfunction: Lactic Acid > 2.0    Time severe sepsis diagnosis confirmed: 1213  04/12/20 as this was the time when Lactate resulted, and the level was > 2.0    3 Hour Severe Sepsis Bundle Completion:    1. Initial Lactic Acid Result:   Recent Labs   Lab Test 04/12/20  1205   LACT 2.4*     2. Blood Cultures before Antibiotics: Yes  3. Broad Spectrum Antibiotics Administered:  yes       Anti-infectives (From admission through now)    Start     Dose/Rate Route Frequency Ordered Stop    04/12/20 1416  vancomycin (VANCOCIN) 2,000 mg in sodium chloride 0.9 % 500 mL intermittent infusion      2,000 mg  over 2 Hours Intravenous ONCE 04/12/20 1416      04/12/20 1410  piperacillin-tazobactam (ZOSYN) intermittent infusion 4.5 g      4.5 g  over 30 Minutes Intravenous ONCE 04/12/20 1409 04/12/20 1502          4. Fluid volume administered in ED:  Obese patient (BMI >30); 30 mL/kg bolus based on IBW given (see amount below).    BMI Readings from Last 1 Encounters:   04/12/20 34.46 kg/m      30 mL/kg fluids based on weight: 2,990 mL  30 mL/kg fluids based on IBW (must be >= 60 inches tall): 1,850 mL                Medical Decision Making:  Annemarie Prajapati is a 55 year old female who presents to the emergency department for evaluation of left knee pain and swelling.  This is in the setting of bilateral knee replacement 7 years ago.  She was seen by [facility] yesterday, had x-ray performed.  And was set to have arthrocentesis tomorrow, however pain was severe, prompting her presentation to the ED.  See HPI as above for additional details.  Vitals and physical exam as above.  Differential was broad and included septic arthritis, OA, crystal arthropathy, bursitis, DVT, Baker's cyst, among others.  The  above work-up was obtained.  Based upon the patient's vital signs, suspected knee infection, and elevated lactate at 2.4, patient met criteria for severe sepsis.  Severe sepsis protocol was initiated as above.  Patient received 2 L per the above criteria.  Blood cultures were obtained prior to administration of broad-spectrum antibiotics.  Knee arthrocentesis was also performed prior to initiation of broad-spectrum antibiotics.  Arthrocentesis revealed turbid aspirate.  This was sent down for further analysis as above.  No evidence for DVT.  X-ray essentially unchanged as above.  I discussed the case with Dr. Luther of orthopedics, who saw the patient in the emergency department.  Plan is for admission and subsequent joint washout tomorrow.  I discussed the case with the hospitalist, who is in agreement for admission.  All questions answered.  Patient admitted in stable condition.    Diagnosis:    ICD-10-CM    1. Acute pain of left knee  M25.562 Glucose fluid     Protein fluid     Gram stain   2. Sepsis (H)  A41.9        Disposition:  Admitted to a medical bed under the care of Dr. Stanley.    I, Miguel Terrell, am serving as a scribe on 4/12/2020 at 12:07 PM to personally document services performed by Jimmie Park PA-C based on my observations and the provider's statements to me.     Miguel Terrell  4/12/2020   [facility] EMERGENCY DEPARTMENT       Jimmie Park PA-C  05/05/20 0894

## 2020-04-12 NOTE — PHARMACY-VANCOMYCIN DOSING SERVICE
Pharmacy Vancomycin Initial Note  Date of Service 2020  Patient's  1965  55 year old, female    Indication: Sepsis    Current estimated CrCl = Estimated Creatinine Clearance: 110 mL/min (based on SCr of 0.71 mg/dL).    Creatinine for last 3 days  2020: 12:05 PM Creatinine 0.71 mg/dL    Recent Vancomycin Level(s) for last 3 days  No results found for requested labs within last 72 hours.      Vancomycin IV Administrations (past 72 hours)                   vancomycin (VANCOCIN) 2,000 mg in sodium chloride 0.9 % 500 mL intermittent infusion (mg) 2,000 mg Given 20 1501                Nephrotoxins and other renal medications (From now, onward)    Start     Dose/Rate Route Frequency Ordered Stop    20 0200  vancomycin (VANCOCIN) 2,000 mg in sodium chloride 0.9 % 500 mL intermittent infusion      2,000 mg  over 2 Hours Intravenous EVERY 12 HOURS 20 1731            Contrast Orders - past 72 hours (72h ago, onward)    None                Plan:  1.  Start vancomycin  2000 mg IV q12h.   2.  Goal Trough Level: 15-20 mg/L   3.  Pharmacy will check trough levels as appropriate in 1-3 Days.    4. Serum creatinine levels will be ordered daily for the first week of therapy and at least twice weekly for subsequent weeks.    5. [place] method utilized to dose vancomycin therapy: Method 1    Kamaljit Gavin [facility]

## 2020-04-12 NOTE — CONSULTS
I will see patient.  Chart reviewed and needs I and D tomorrow w possible explant.  I will discuss with primary orthopedist and arrange surgery.

## 2020-04-12 NOTE — PLAN OF CARE
"/51 (BP Location: Left arm)   Pulse 82   Temp 98.6  F (37  C) (Temporal)   Resp 16   Ht 1.702 m (5' 7\")   Wt 99.8 kg (220 lb)   LMP 10/13/2016 (Exact Date)   SpO2 100%   BMI 34.46 kg/m    Pt admitted 4/11 w/ L knee pain, swelling & redness. Aspiration of knee showing infection of rare gram positive cocci. Has received vanco in ED. Started on ancef tonight. Will continue IV vanco and ancef. PIV has NS running at 100 ml/hr. Pt is A&Ox4, LS clear on RA. On tele SR. Pt had suspected syncopal event, denies dizziness, lightheadedness. BS hypoactive. Skin is clean and dry w/ coolness to hands and feet, denies N/T, N/V, SOB, CP. L knee is swollen and tender. Ambulated SBA - Assist x 1 w/ walker, GB & L toe touch. On reg diet, will be NPO @ midnight for potential surgery tomorrow - no time set up. Lactic 0.6, WBC 14.8 & .0. Plan Ortho to consult and potential surgery 4/12.  "

## 2020-04-12 NOTE — ED NOTES
"[facility]  ED Nurse Handoff Report    Annemarie Prajapati is a 55 year old female   ED Chief complaint: Knee Pain  . ED Diagnosis:   Final diagnoses:   Acute pain of left knee   Sepsis (H)     Allergies: No Known Allergies    Code Status: Full Code  Activity level - Baseline/Home:  Independent. Activity Level - Current:   Assist X 1. Lift room needed: No. Bariatric: No   Needed: No   Isolation: No. Infection: Not Applicable.     Vital Signs:   Vitals:    04/12/20 1152 04/12/20 1200 04/12/20 1215   BP:  131/75    Pulse: 102 103    Resp: 18     Temp: 99.6  F (37.6  C)     TempSrc: Oral     SpO2: 97% 95% 97%   Weight: 99.8 kg (220 lb)     Height: 1.702 m (5' 7\")         Cardiac Rhythm:  ,      Pain level: 0-10 Pain Scale: 8  Patient confused: No. Patient Falls Risk: Yes.   Elimination Status: has not yet voided in ER   Patient Report - Initial Complaint: left knee pain. Focused Assessment: generalized knee swelling and pain. History of knee replacement   Tests Performed: labs, ultrasound, xray, joint aspiration. Abnormal Results:   Labs Ordered and Resulted from Time of ED Arrival Up to the Time of Departure from the ED   CBC WITH PLATELETS DIFFERENTIAL - Abnormal; Notable for the following components:       Result Value    WBC 14.8 (*)     Absolute Neutrophil 11.9 (*)     All other components within normal limits   BASIC METABOLIC PANEL - Abnormal; Notable for the following components:    Glucose 147 (*)     All other components within normal limits   LACTIC ACID WHOLE BLOOD - Abnormal; Notable for the following components:    Lactic Acid 2.4 (*)     All other components within normal limits   CRP INFLAMMATION - Abnormal; Notable for the following components:    CRP Inflammation 163.0 (*)     All other components within normal limits   ERYTHROCYTE SEDIMENTATION RATE AUTO   NURSING DRAW AND HOLD   BLOOD CULTURE   BLOOD CULTURE   CRYSTAL ID SYNOVIAL FLUID   CELL COUNT WITH DIFFERENTIAL FLUID "   GLUCOSE FLUID   PROTEIN FLUID   GRAM STAIN   FLUID CULTURE AEROBIC BACTERIAL     XR Knee Left 3 Views   Final Result   IMPRESSION:  Left total knee arthroplasty with patellar resurfacing   projects in expected position, unchanged. No acute displaced   periprosthetic fracture. Small to moderate-sized knee joint effusion.   Small amount of lucency is seen about the patellar resurfacing   component, which could be seen with loosening. Small to moderate-sized   knee joint effusion. Mild anterior knee soft tissue swelling. Midline   skin staples and surgical drain removed. Resolution of soft tissue and   intra-articular gas.      ABELARDO CALVO MD      US Lower Extremity Venous Duplex Left   Final Result   IMPRESSION:   1.  No deep venous thrombosis in the left lower extremity.   2.  Baker cyst identified on the left.      MIKI ESTRADA MD      .   Treatments provided: see MAR  Family Comments: not present  OBS brochure/video discussed/provided to patient:  N/A  ED Medications:   Medications   0.9% sodium chloride BOLUS (has no administration in time range)   lidocaine 1 % injection (has no administration in time range)   piperacillin-tazobactam (ZOSYN) intermittent infusion 4.5 g (has no administration in time range)   0.9% sodium chloride BOLUS (1,000 mLs Intravenous New Bag 4/12/20 1213)   HYDROmorphone (PF) (DILAUDID) injection 0.5 mg (0.5 mg Intravenous Given 4/12/20 1213)     Drips infusing:  Yes  For the majority of the shift, the patient's behavior Green. Interventions performed were reinforced plan of care.    Sepsis treatment initiated: No     RECEIVING UNIT ED HANDOFF REVIEW    Above ED Nurse Handoff Report was reviewed: Yes  Reviewed by: Ambrosio Sarah RN on April 12, 2020 at 4:08 PM     ED Nurse Name/Phone Number: Angelika Gonzalez RN,   2:13 PM

## 2020-04-12 NOTE — PHARMACY-ADMISSION MEDICATION HISTORY
Admission medication history interview status for this patient is complete. See [facility] admission navigator for allergy information, prior to admission medications and immunization status.     Medication history interview source(s):Patient via phone  Medication history resources (including written lists, pill bottles, clinic record): [facility] dispense records  Primary pharmacy: [facility]    Changes made to PTA medication list:  Added: trazodone, cyclobenzaprine  Deleted: tramadol, amitriptyline, excedrin, celecoxib, hydrocortisone cream, ondansetron, oxycodone-apap, senna-docusate  Changed: naproxen--> prn, added dose to bupropion    Actions taken by pharmacist (provider contacted, etc):None     Additional medication history information:None    Medication reconciliation/reorder completed by provider prior to medication history?  N   (Y/N)     For patients on insulin therapy: N  (Y/N)        Prior to Admission medications    Medication Sig Last Dose Taking? Auth Provider   acetaminophen 650 MG TABS Take 325-650 mg by mouth every 6 hours as needed for pain. 4/12/2020 at Unknown time Yes Kit Moore MD   buPROPion (WELLBUTRIN XL) 300 MG 24 hr tablet Take 300 mg by mouth every evening 4/11/2020 at pm Yes Unknown, Entered By History   cyclobenzaprine (FLEXERIL) 10 MG tablet Take 10 mg by mouth 3 times daily as needed for muscle spasms Past Week at Unknown time Yes Unknown, Entered By History   naproxen (NAPROSYN) 250 MG tablet Take 250 mg by mouth 2 times daily as needed for moderate pain 4/12/2020 at Unknown time Yes Unknown, Entered By History   traZODone (DESYREL) 50 MG tablet Take 50 mg by mouth nightly as needed for sleep 4/11/2020 at Unknown time Yes Unknown, Entered By History

## 2020-04-12 NOTE — SIGNIFICANT EVENT
Unit(s) of [facility] called, had to correct previous gram stain on L knee synovial fluid which showed no growth. Corrected result Rare gram positive cocci. MD notified.

## 2020-04-12 NOTE — H&P
[facility]    History and Physical - Hospitalist Service       Date of Admission:  4/12/2020    Assessment & Plan   Annemarie Prajapati is a 55 year old female admitted on 4/12/2020. She has a past medical history significant for migraine headaches, depression, osteoarthritis, and latent tuberculosis.  She presented to emergency room with left knee pain and swelling.  Found to have suspected infected left knee arthroplasty.    1.  Sepsis.  Due to suspected acutely infected left knee arthroplasty.  Appreciate orthopedic surgery input.  Continue antibiotics with IV cefazolin and vancomycin.  Orthopedic surgery suspect the patient will need to go to operating room tomorrow.  N.p.o. after midnight.  Pain medications as needed.  Start incentive spirometry.  Also check chest x-ray and urine analysis to evaluate for possible other sources of sepsis.    2.  Suspected acutely infected left knee.  Continue IV vancomycin and cefazolin.  Place formal orthopedic surgery consult.    3.  Depression.  Restart Wellbutrin.    4.  Suspected syncopal event.  Monitor on telemetry.  Check echocardiogram.    5.  Tachycardia.  Mild.  Monitor on telemetry.  Check echocardiogram.  Check TSH level.       Diet: Regular diet.  N.p.o. at midnight.  DVT Prophylaxis: Pneumatic Compression Devices  Leo Catheter: not present  Code Status: Full code    Disposition Plan   Expected discharge: 2 - 3 days, recommended to prior living arrangement     Jv Stanley, DO  [facility]    ______________________________________________________________________    Chief Complaint   Left knee pain.    History is obtained from the patient    History of Present Illness   Annemarie Prajapati is a 55 year old female who has a past medical history significant for migraine headaches, depression, osteoarthritis, and latent tuberculosis.  She developed left knee pain and swelling 2 days ago.  She did not have any trauma to the knee at the  time.  Has noticed only a very small amount of redness over her left knee.  Pain and swelling seem to be getting gradually worse.  She rates the pain at a 8 out of 10 on pain scale today.  She has been trying to take Naprosyn and acetaminophen at home for pain.  Does not feel that these medications have had any significant effect.  She does feel the pain medications given in the emergency room have helped pain quite a bit.  She does not remember having pain and swelling of her knee like this previously.  She has had previous bilateral knee replacements.  She also notes that she had an episode yesterday where she was going to sit in a chair.  Next thing that she knew she woke up on the ground.  She is not sure what happened.  She was feeling dizzy and lightheaded at the time prior to this episode.  She did not have any chest pain.  She has not had any recent cough.  Has felt like she has had some low-grade fevers and chills.  She does not remember having any previous syncopal events.  She does note that she recently had an upper respiratory tract infection.  She felt that she had gotten sick at the very end of February with an influenza-like illness.  Lasted for approximately 3 weeks.  She was having a cough and shortness of breath during that time.  Had also been having fevers and chills.  Her cough, shortness of breath, fevers, and chills seem to have resolved almost 2 weeks ago.  No other acute complaints.    Review of Systems    The 10 point Review of Systems is negative other than noted in the HPI    Past Medical History    I have reviewed this patient's medical history and updated it with pertinent information if needed.   Past Medical History:   Diagnosis Date     Depression      History of mononucleosis      Knee pain, bilateral      Migraine        Past Surgical History   I have reviewed this patient's surgical history and updated it with pertinent information if needed.  Past Surgical History:   Procedure  Laterality Date     ARTHROPLASTY KNEE BILATERAL  1/7/2013    Procedure: ARTHROPLASTY KNEE BILATERAL;  Bilateral Total Knee Arthroplasty  Pt also recieved an epidural.  Left TKA started at 0804,end at 0941  Right TKA incision at 0948, ended at 1137;  Surgeon: Michael Trimble MD;  Location: [facility] OR     HC TOOTH EXTRACTION W/FORCEP      wisdom teeth       Social History   I have reviewed this patient's social history and updated it with pertinent information if needed.  Social History     Tobacco Use     Smoking status: Never Smoker     Smokeless tobacco: Never Used   Substance Use Topics     Alcohol use: Yes     Alcohol/week: 0.0 standard drinks     Comment: 2 wine / week     Drug use: No       Family History   I have reviewed this patient's family history and updated it with pertinent information if needed.   Family History   Problem Relation Age of Onset     Diabetes Mother      Coronary Artery Disease Mother      Thyroid Disease Mother      Coronary Artery Disease Father      Hypertension Father      Hyperlipidemia Father      Prostate Cancer Father      Anesthesia Reaction Maternal Grandfather      Anesthesia Reaction Paternal Grandmother        Prior to Admission Medications   Prior to Admission Medications   Prescriptions Last Dose Informant Patient Reported? Taking?   acetaminophen 650 MG TABS 4/12/2020 at Unknown time  No Yes   Sig: Take 325-650 mg by mouth every 6 hours as needed for pain.   buPROPion (WELLBUTRIN XL) 300 MG 24 hr tablet 4/11/2020 at pm  Yes Yes   Sig: Take 300 mg by mouth every evening   cyclobenzaprine (FLEXERIL) 10 MG tablet Past Week at Unknown time  Yes Yes   Sig: Take 10 mg by mouth 3 times daily as needed for muscle spasms   naproxen (NAPROSYN) 250 MG tablet 4/12/2020 at Unknown time  Yes Yes   Sig: Take 250 mg by mouth 2 times daily as needed for moderate pain   traZODone (DESYREL) 50 MG tablet 4/11/2020 at Unknown time  Yes Yes   Sig: Take 50 mg by mouth nightly as needed for  sleep      Facility-Administered Medications: None     Allergies   No Known Allergies    Physical Exam   Vital Signs: Temp: 99.6  F (37.6  C) Temp src: Oral BP: 94/58 Pulse: 103 Heart Rate: 102 Resp: 18 SpO2: 98 % O2 Device: None (Room air)    Weight: 220 lbs 0 oz    Gen:  NAD, A&Ox3.  Eyes:  PERRL, sclera anicteric.  OP:  MMM, no lesions.  Neck:  Supple.  CV:  Regular, no murmurs.  Lung:  CTA b/l, normal effort.  Ab:  +BS, soft.  Skin:  Warm, dry to touch.  No rash.  Ext:  No pitting edema LE b/l.      Data   Data reviewed today: I reviewed all medications, new labs and imaging results over the last 24 hours.    Recent Labs   Lab 04/12/20  1205   WBC 14.8*   HGB 13.6   MCV 93         POTASSIUM 3.8   CHLORIDE 106   CO2 26   BUN 11   CR 0.71   ANIONGAP 4   VINOD 8.7   *

## 2020-04-12 NOTE — ED PROVIDER NOTES
"Emergency Department Attending Supervision Note  4/12/2020  12:43 PM      I evaluated this patient in conjunction with Jimmie Park PA-C.    Briefly, this 55 year old woman presented with left knee pain and swelling for 2 days for which she was seen at [facility] yesterday and is scheduled to see [facility] tomorrow. She declined ER referral yesterday. However, she could not tolerate the pain which prompted her visit. She has had bilateral TKAs 7 years ago. She denies fevers or vomiting though she has had some nausea and has felt generally weak and unwell for the last several days.    On my exam,   Patient Vitals for the past 24 hrs:   BP Temp Temp src Pulse Heart Rate Resp SpO2 Height Weight   04/12/20 1215 -- -- -- -- -- -- 97 % -- --   04/12/20 1200 131/75 -- -- 103 -- -- 95 % -- --   04/12/20 1152 -- 99.6  F (37.6  C) Oral 102 102 18 97 % 1.702 m (5' 7\") 99.8 kg (220 lb)     General: Well-developed and well-nourished. Well appearing middle aged  woman. Cooperative.  Head:  Atraumatic.  Eyes:  Conjunctivae, lids, and sclerae are normal.  ENT:    Normal nose. Moist mucous membranes.  Neck:  Supple. Normal range of motion.  Resp:  No respiratory distress.   GI:  Non-distended.     MS:  Left knee effusion with pain on both active and passive range of motion.  No bony tenderness.  The area is warm as compared to the contralateral side without erythema.  There is mild edema distal to the knee  Skin:  Warm. Non-diaphoretic. No pallor.  No freya cellulitis.  Neuro:  Awake. A&Ox3. Normal strength.  Psych: Normal mood and affect. Normal speech.  Vitals reviewed.    Results:  Labs Ordered and Resulted from Time of ED Arrival Up to the Time of Departure from the ED   CBC WITH PLATELETS DIFFERENTIAL - Abnormal; Notable for the following components:       Result Value    WBC 14.8 (*)     Absolute Neutrophil 11.9 (*)     All other components within normal limits   BASIC METABOLIC PANEL - Abnormal; Notable for the " following components:    Glucose 147 (*)     All other components within normal limits   LACTIC ACID WHOLE BLOOD - Abnormal; Notable for the following components:    Lactic Acid 2.4 (*)     All other components within normal limits   CRP INFLAMMATION - Abnormal; Notable for the following components:    CRP Inflammation 163.0 (*)     All other components within normal limits   LACTIC ACID WHOLE BLOOD - Abnormal; Notable for the following components:    Lactic Acid 0.6 (*)     All other components within normal limits   ERYTHROCYTE SEDIMENTATION RATE AUTO   BLOOD CULTURE   BLOOD CULTURE   CRYSTAL ID SYNOVIAL FLUID   CELL COUNT WITH DIFFERENTIAL FLUID   GLUCOSE FLUID   PROTEIN FLUID   GRAM STAIN   FLUID CULTURE AEROBIC BACTERIAL     XR Knee Left 3 Views   Preliminary Result   IMPRESSION:  Left total knee arthroplasty with patellar resurfacing   projects in expected position, unchanged. No acute displaced   periprosthetic fracture. Small to moderate-sized knee joint effusion.   Small amount of lucency is seen about the patellar resurfacing   component, which could be seen with loosening. Small to moderate-sized   knee joint effusion. Mild anterior knee soft tissue swelling. Midline   skin staples and surgical drain removed. Resolution of soft tissue and   intra-articular gas.      US Lower Extremity Venous Duplex Left   Preliminary Result   IMPRESSION:   1.  No deep venous thrombosis in the left lower extremity.   2.  Baker cyst identified on the left.        ED course:  1342: I assisted Jimmie Park PA-C, with the joint aspiration, as documented in his note.     MDM:   Annemarie is a 55 year old healthy woman with bilateral TKAs who has had 2 days of left knee pain and swelling as well as some nausea and generally feeling weak and unwell.  On exam she has at least a moderate sized left knee effusion and pain with both active and passive range of motion highly concerning for septic arthritis.  Fortunately, it is not  significantly erythematous without evidence of cellulitis.  X-ray shows evidence of effusion without other acute pathology and a DVT study is negative.  She is initially mildly tachycardic with elevated white blood cell count to 14.8 and meeting criteria for sepsis.  With suspected source of infection (septic arthritis) and a lactic acid of 2.4, she meets criteria for severe sepsis.  Her lactic acidosis resolved after 2 L of IV fluids and she does not have evidence of shock or vasopressor requirement.  Cultures were obtained prior to initiation of vancomycin and Zosyn just after aspiration of the left knee which revealed turbid, infected appearing synovial fluid.  Cell count on this fluid was greater than 74,000 with Gram stain negative and culture pending consistent with septic arthritis.  The remainder of her work-up is largely reassuring although elevated CRP of 163 is appreciated.  Her pain was controlled with Dilaudid and she required no further interventions.  She was admitted to the hospitalist service after discussion with orthopedics as per KENTON Park's note.    Diagnosis:  1.  Left knee septic arthritis, history of TKA  2.  Severe sepsis secondary to #1  3.  Incidental left Baker's cyst           Felecia Lozano MD  04/12/20 6725

## 2020-04-13 ENCOUNTER — APPOINTMENT (OUTPATIENT)
Dept: CARDIOLOGY | Facility: CLINIC | Age: 55
DRG: 485 | End: 2020-04-13
Attending: INTERNAL MEDICINE
Payer: COMMERCIAL

## 2020-04-13 ENCOUNTER — ANESTHESIA (OUTPATIENT)
Dept: SURGERY | Facility: CLINIC | Age: 55
DRG: 485 | End: 2020-04-13
Payer: COMMERCIAL

## 2020-04-13 LAB
ANION GAP SERPL CALCULATED.3IONS-SCNC: 3 MMOL/L (ref 3–14)
BACTERIA SPEC CULT: NO GROWTH
BUN SERPL-MCNC: 8 MG/DL (ref 7–30)
CALCIUM SERPL-MCNC: 8.2 MG/DL (ref 8.5–10.1)
CHLORIDE SERPL-SCNC: 111 MMOL/L (ref 94–109)
CO2 SERPL-SCNC: 25 MMOL/L (ref 20–32)
CREAT SERPL-MCNC: 0.69 MG/DL (ref 0.52–1.04)
ERYTHROCYTE [DISTWIDTH] IN BLOOD BY AUTOMATED COUNT: 13.8 % (ref 10–15)
GFR SERPL CREATININE-BSD FRML MDRD: >90 ML/MIN/{1.73_M2}
GLUCOSE SERPL-MCNC: 108 MG/DL (ref 70–99)
HCT VFR BLD AUTO: 40.8 % (ref 35–47)
HGB BLD-MCNC: 13 G/DL (ref 11.7–15.7)
Lab: NORMAL
MCH RBC QN AUTO: 30.3 PG (ref 26.5–33)
MCHC RBC AUTO-ENTMCNC: 31.9 G/DL (ref 31.5–36.5)
MCV RBC AUTO: 95 FL (ref 78–100)
PLATELET # BLD AUTO: 221 10E9/L (ref 150–450)
POTASSIUM SERPL-SCNC: 4 MMOL/L (ref 3.4–5.3)
RBC # BLD AUTO: 4.29 10E12/L (ref 3.8–5.2)
SODIUM SERPL-SCNC: 139 MMOL/L (ref 133–144)
SPECIMEN SOURCE: NORMAL
WBC # BLD AUTO: 9.9 10E9/L (ref 4–11)

## 2020-04-13 PROCEDURE — 25000128 H RX IP 250 OP 636: Performed by: INTERNAL MEDICINE

## 2020-04-13 PROCEDURE — 99233 SBSQ HOSP IP/OBS HIGH 50: CPT | Performed by: HOSPITALIST

## 2020-04-13 PROCEDURE — 80048 BASIC METABOLIC PNL TOTAL CA: CPT | Performed by: INTERNAL MEDICINE

## 2020-04-13 PROCEDURE — 93306 TTE W/DOPPLER COMPLETE: CPT

## 2020-04-13 PROCEDURE — 85027 COMPLETE CBC AUTOMATED: CPT | Performed by: INTERNAL MEDICINE

## 2020-04-13 PROCEDURE — 82565 ASSAY OF CREATININE: CPT | Performed by: INTERNAL MEDICINE

## 2020-04-13 PROCEDURE — 25000132 ZZH RX MED GY IP 250 OP 250 PS 637: Performed by: INTERNAL MEDICINE

## 2020-04-13 PROCEDURE — 25800030 ZZH RX IP 258 OP 636: Performed by: INTERNAL MEDICINE

## 2020-04-13 PROCEDURE — 93306 TTE W/DOPPLER COMPLETE: CPT | Mod: 26 | Performed by: INTERNAL MEDICINE

## 2020-04-13 PROCEDURE — 36415 COLL VENOUS BLD VENIPUNCTURE: CPT | Performed by: INTERNAL MEDICINE

## 2020-04-13 PROCEDURE — 12000000 ZZH R&B MED SURG/OB

## 2020-04-13 RX ORDER — CEFTRIAXONE 2 G/1
2 INJECTION, POWDER, FOR SOLUTION INTRAMUSCULAR; INTRAVENOUS EVERY 24 HOURS
Status: DISCONTINUED | OUTPATIENT
Start: 2020-04-13 | End: 2020-04-16 | Stop reason: HOSPADM

## 2020-04-13 RX ADMIN — SODIUM CHLORIDE: 9 INJECTION, SOLUTION INTRAVENOUS at 21:15

## 2020-04-13 RX ADMIN — ACETAMINOPHEN 650 MG: 325 TABLET, FILM COATED ORAL at 12:53

## 2020-04-13 RX ADMIN — BUPROPION HYDROCHLORIDE 300 MG: 150 TABLET, EXTENDED RELEASE ORAL at 21:16

## 2020-04-13 RX ADMIN — VANCOMYCIN HYDROCHLORIDE 2000 MG: 10 INJECTION, POWDER, LYOPHILIZED, FOR SOLUTION INTRAVENOUS at 14:43

## 2020-04-13 RX ADMIN — SENNOSIDES AND DOCUSATE SODIUM 1 TABLET: 8.6; 5 TABLET ORAL at 09:12

## 2020-04-13 RX ADMIN — HYDROMORPHONE HYDROCHLORIDE 0.5 MG: 1 INJECTION, SOLUTION INTRAMUSCULAR; INTRAVENOUS; SUBCUTANEOUS at 02:55

## 2020-04-13 RX ADMIN — OXYCODONE HYDROCHLORIDE 10 MG: 5 TABLET ORAL at 21:16

## 2020-04-13 RX ADMIN — VANCOMYCIN HYDROCHLORIDE 2000 MG: 10 INJECTION, POWDER, LYOPHILIZED, FOR SOLUTION INTRAVENOUS at 03:58

## 2020-04-13 RX ADMIN — OXYCODONE HYDROCHLORIDE 10 MG: 5 TABLET ORAL at 17:03

## 2020-04-13 RX ADMIN — OXYCODONE HYDROCHLORIDE 10 MG: 5 TABLET ORAL at 09:12

## 2020-04-13 RX ADMIN — CEFTRIAXONE 2 G: 2 INJECTION, POWDER, FOR SOLUTION INTRAMUSCULAR; INTRAVENOUS at 18:17

## 2020-04-13 RX ADMIN — SENNOSIDES AND DOCUSATE SODIUM 2 TABLET: 8.6; 5 TABLET ORAL at 21:24

## 2020-04-13 RX ADMIN — HYDROMORPHONE HYDROCHLORIDE 0.5 MG: 1 INJECTION, SOLUTION INTRAMUSCULAR; INTRAVENOUS; SUBCUTANEOUS at 16:29

## 2020-04-13 RX ADMIN — HYDROMORPHONE HYDROCHLORIDE 0.5 MG: 1 INJECTION, SOLUTION INTRAMUSCULAR; INTRAVENOUS; SUBCUTANEOUS at 05:19

## 2020-04-13 RX ADMIN — SODIUM CHLORIDE: 9 INJECTION, SOLUTION INTRAVENOUS at 03:59

## 2020-04-13 RX ADMIN — OXYCODONE HYDROCHLORIDE 10 MG: 5 TABLET ORAL at 12:53

## 2020-04-13 RX ADMIN — CEFAZOLIN SODIUM 1 G: 1 INJECTION, SOLUTION INTRAVENOUS at 09:44

## 2020-04-13 RX ADMIN — HYDROMORPHONE HYDROCHLORIDE 0.5 MG: 1 INJECTION, SOLUTION INTRAMUSCULAR; INTRAVENOUS; SUBCUTANEOUS at 07:39

## 2020-04-13 RX ADMIN — CEFAZOLIN SODIUM 1 G: 1 INJECTION, SOLUTION INTRAVENOUS at 02:49

## 2020-04-13 RX ADMIN — HYDROMORPHONE HYDROCHLORIDE 0.5 MG: 1 INJECTION, SOLUTION INTRAMUSCULAR; INTRAVENOUS; SUBCUTANEOUS at 00:20

## 2020-04-13 ASSESSMENT — ACTIVITIES OF DAILY LIVING (ADL)
ADLS_ACUITY_SCORE: 13

## 2020-04-13 NOTE — CONSULTS
Patient well known to me.  7 years s/p bilateral TKA.  Now with acute onset (3 days) of left knee pain and swelling.  Elevated CRP, aspiration positive for gram positive cocci and an elevated WBC count with 96% PMN.      The clinical picture is consistent with a bacterial infection of the left knee.  Her x-rays look good and her knee was functioning well prior to the last weekend.  I discussed options with her and I recommend an Irrigation and debridement of the the knee with polyethylene exchange, followed by IV antibiotics.  She understands the risks and benefits of this and wishes to proceed.  This includes the risk of failure and possible need for future surgery.  Unfortunately, the polyethylene is not in [place] and and needs to be sent here from the  in [place], [place].  We will plan for surgery tomorrow.

## 2020-04-13 NOTE — CONSULTS
ID consult dictated IMP 1 56 yo female late infected L TKA    REc await cx, op findings, vanco/ceftriaxone for now

## 2020-04-13 NOTE — PLAN OF CARE
Day RN  Low grade temp highest 100.7 gave tylenol patient had chills  other vss. +3 swelling in L knee which extends down below knee.   Knee is painful, pain is reduced some with oral and or iv dilaudid.   Up with SBA1 gait belt and walker to bedside commode.   Ate and drank well. Voiding last bm yesterday  Slept on and off between cares  Echo done today  Iv ancef and vanco  ID consulted   NPo after 12am, I& D Tomorrow.

## 2020-04-13 NOTE — PROGRESS NOTES
[facility]    Hospitalist Progress Note    Date of Service (when I saw the patient): 04/13/2020  Provider:  Nash Lopez MD   Text Page  7am - 6PM       Assessment & Plan   Annemarie Prajapati is a 55 year old female admitted on 4/12/2020. She has a past medical history significant for migraine headaches, depression, osteoarthritis, and latent tuberculosis.  She presented to emergency room with left knee pain and swelling.  Found to have suspected infected left knee arthroplasty.     1.  Sepsis secondary to acutely infected left knee arthroplasty.  Appreciate orthopedic surgery involvement.   IV cefazolin and vancomycin.    Operating room tomorrow.  N.p.o. after midnight.  Pain medications as needed.  Incentive spirometry.        2.  Suspected acutely infected left knee hard ware.  Continue IV vancomycin and cefazolin. Pain meds prn.      3.  Depression. Wellbutrin.     4.  Possible syncopal event. Associated to acme of knee pain, suspect vasovagal. Monitor on telemetry.  Normal echocardiogram.     5.  Sinus tachycardia.  Likely [pain driven. Monitor on telemetry. Normal TSH level.     Leo Catheter: not present  Diet: Regular diet.  N.p.o. at midnight.  DVT Prophylaxis: Pneumatic Compression Devices  Code Status: Full Code    Disposition: Expected discharge in 2 - 3 days once surgery complete.     Interval History   Better, Pain is 5 - 6/10 at the moment. It was not well controlled overnight. No fever, Good oral tolerance. Request change to feet cuff- pcds b/c of pain in left knee,     -Data reviewed today: I reviewed all new labs and imaging results over the last 24 hours. I personally reviewed the EKG tracing showing Sinus in monitor .    Physical Exam   Temp: 99.4  F (37.4  C) Temp src: Temporal BP: 109/64 Pulse: 82 Heart Rate: 90 Resp: 16 SpO2: 93 % O2 Device: None (Room air)    Vitals:    04/12/20 1152   Weight: 99.8 kg (220 lb)     Vital Signs with Ranges  Temp:  [98.6  F (37  C)-99.6  F (37.6   C)] 99.4  F (37.4  C)  Pulse:  [] 82  Heart Rate:  [] 90  Resp:  [16-18] 16  BP: ()/(51-75) 109/64  SpO2:  [93 %-100 %] 93 %  I/O last 3 completed shifts:  In: 0   Out: 400 [Urine:400]    GEN:  Alert, oriented x 3, appears comfortable, NAD.  HEENT:  Normocephalic/atraumatic, no scleral icterus, no nasal discharge, mouth moist.  CV:  Regular rate and rhythm, no murmur or JVD.  S1 + S2 noted, no S3 or S4.  LUNGS:  Clear to auscultation bilaterally without rales/rhonchi/wheezing/retractions.  Symmetric chest rise on inhalation noted.  ABD:  Active bowel sounds, soft, non-tender/non-distended.  No rebound/guarding/rigidity.  EXT:  Left knee is swollen, not visible erythema. No edema or cyanosis.  No joint synovitis noted.  SKIN:  Dry to touch, no exanthems noted in the visualized areas.       Medications     sodium chloride 100 mL/hr at 04/13/20 0359       buPROPion  300 mg Oral QPM     ceFAZolin  1 g Intravenous Q8H     sodium chloride (PF)  3 mL Intracatheter Q8H     sodium chloride (PF)  3 mL Intracatheter Q8H     tranexamic acid  1 g Intravenous Once     vancomycin (VANCOCIN) IV  2,000 mg Intravenous Q12H       Data   Recent Labs   Lab 04/13/20  0704 04/12/20  1205   WBC 9.9 14.8*   HGB 13.0 13.6   MCV 95 93    268    136   POTASSIUM 4.0 3.8   CHLORIDE 111* 106   CO2 25 26   BUN 8 11   CR 0.69 0.71   ANIONGAP 3 4   VINOD 8.2* 8.7   * 147*       Recent Results (from the past 24 hour(s))   US Lower Extremity Venous Duplex Left    Narrative    ULTRASOUND LOWER EXTREMITY VENOUS DUPLEX LEFT 4/12/2020 12:39 PM    CLINICAL HISTORY: Swelling, pain.    TECHNIQUE: Venous Duplex ultrasound of the left lower extremity with  and without compression, augmentation and duplex. Color flow and  spectral Doppler with waveform analysis performed.    COMPARISON: None.    FINDINGS: Exam includes the common femoral, femoral, popliteal, and  contralateral common femoral veins as well as segmentally  visualized  deep calf veins and greater saphenous vein.     LEFT: No deep vein thrombosis. No superficial thrombophlebitis. 4.8 x  2.3 x 1.1 cm popliteal fossa cyst.      Impression    IMPRESSION:  1.  No deep venous thrombosis in the left lower extremity.  2.  Baker cyst identified on the left.    MIKI ESTRADA MD   XR Knee Left 3 Views    Narrative    LEFT KNEE THREE VIEWS  2020 12:42 PM    HISTORY: Knee pain and swelling.    COMPARISON: 2013      Impression    IMPRESSION:  Left total knee arthroplasty with patellar resurfacing  projects in expected position, unchanged. No acute displaced  periprosthetic fracture. Small to moderate-sized knee joint effusion.  Small amount of lucency is seen about the patellar resurfacing  component, which could be seen with loosening. Small to moderate-sized  knee joint effusion. Mild anterior knee soft tissue swelling. Midline  skin staples and surgical drain removed. Resolution of soft tissue and  intra-articular gas.    ABELARDO CALVO MD   XR Chest Port 1 View    Narrative    EXAM: XR CHEST PORT 1 VW  LOCATION: [facility]  DATE/TIME: 2020 8:04 PM    INDICATION: Fever  COMPARISON: 2017      Impression    IMPRESSION: Negative chest.   Echocardiogram Complete    Narrative    [id number]  [id number]  [id number]  [id number]^ANTON^LUDMILA^D           [facility]  Echocardiography Laboratory  [address] [place] [address]        Name: COCO ZUÑIGA  MRN: [id number]  : 1965  Study Date: 2020 08:04 AM  Age: 55 yrs  Gender: Female  Patient Location: [facility]  Reason For Study: Syncope  Ordering Physician: LUDMILA WALTON  Referring Physician: [facility]  Performed By: Arnold Morgan RDCS     BSA: 2.1 m2  Height: 67 in  Weight: 220 lb  HR: 94  BP: 94/58 mmHg  _____________________________________________________________________________  __        Procedure  Complete Portable Echo  Adult.  _____________________________________________________________________________  __        Interpretation Summary     Normal transthoracic echocardiogram. There is no comparison study available.  _____________________________________________________________________________  __        Left Ventricle  The left ventricle is normal in size. There is normal left ventricular wall  thickness. The visual ejection fraction is estimated at 55-60%. Left  ventricular diastolic function is normal. No regional wall motion  abnormalities noted.     Right Ventricle  The right ventricle is normal in structure, function and size.     Atria  Normal left atrial size. Right atrial size is normal. There is no color  Doppler evidence of an atrial shunt.     Mitral Valve  The mitral valve is normal in structure and function.        Tricuspid Valve  The tricuspid valve is normal in structure and function. Right ventricle  systolic pressure estimate normal. There is trace to mild tricuspid  regurgitation.     Aortic Valve  The aortic valve is normal in structure and function. There is trace aortic  regurgitation.     Pulmonic Valve  Normal pulmonic valve.     Vessels  Normal size aorta. Normal size ascending aorta. The inferior vena cava is  normal.     Pericardium  The pericardium appears normal.        Rhythm  Sinus rhythm was noted.  _____________________________________________________________________________  __  MMode/2D Measurements & Calculations  IVSd: 0.88 cm     LVIDd: 4.9 cm  LVIDs: 2.9 cm  LVPWd: 0.86 cm  FS: 41.0 %  LV mass(C)d: 146.3 grams  LV mass(C)dI: 69.5 grams/m2  Ao root diam: 3.1 cm  LA dimension: 3.6 cm  asc Aorta Diam: 3.3 cm  LA/Ao: 1.2  LVOT diam: 2.1 cm  LVOT area: 3.6 cm2  LA Volume (BP): 46.8 ml  LA Volume Index (BP): 22.2 ml/m2  RWT: 0.35           Doppler Measurements & Calculations  MV E max rina: 75.7 cm/sec  MV A max rina: 85.9 cm/sec  MV E/A: 0.88  MV max P.6 mmHg  MV mean P.6 mmHg  MV V2 VTI:  19.1 cm  MVA(VTI): 4.0 cm2  MV dec slope: 457.2 cm/sec2  MV dec time: 0.17 sec  AI P1/2t: 433.3 msec  LV V1 max P.5 mmHg  LV V1 max: 127.6 cm/sec  LV V1 VTI: 21.3 cm  CO(LVOT): 6.4 l/min  CI(LVOT): 3.0 l/min/m2  SV(LVOT): 76.1 ml  SI(LVOT): 36.1 ml/m2  PA acc time: 0.10 sec  TR max rina: 262.9 cm/sec  TR max P.7 mmHg  E/E' av.6  Lateral E/e': 5.7  Medial E/e': 7.4              _____________________________________________________________________________  __        Report approved by: Leonid Johnson 2020 10:14 AM            Disclaimer: This note consists of symbols derived from keyboarding, dictation and/or voice recognition software. As a result, there may be errors in the script that have gone undetected. Please consider this when interpreting information found in this chart.

## 2020-04-13 NOTE — CONSULTS
Consult Date:  04/13/2020      INFECTIOUS DISEASE CONSULTATION       ROOM:  [address], [facility].       REFERRING PHYSICIAN:  Michael Trimble MD       IMPRESSION:   1.  Healthy 55-year-old female with a late, relatively acute left total knee arthroplasty infection, obvious infection and Gram stain positive, full culture pending, no major clinical sepsis.   2.  Obesity.   3.  Degenerative arthritis of bilateral knee replacements with no prior infection history.      RECOMMENDATIONS:   1.  Continue vanco and we will add ceftriaxone.   2.  Await full culture information and operative findings per Dr. Trimble.  Plan is an exchange arthroplasty, as such likely 6 weeks IV followed by 3 months of intensive oral and then discussion on long-term possible suppression.      HISTORY:  This 55-year-old female is seen in consultation due to an infected left total knee arthroplasty.  The patient has a history of being generally healthy.  She did see the dentist for a cleaning 6 weeks ago and had a respiratory infection that lasted a few weeks, late February into March, thought she probably had COVID-19, was not diagnosed as such.  No interventions imaging or antibiotics given.  She now presents with acute onset of both mild systemic symptoms and major left knee discomfort.  She does not have any other obvious focal or localizing symptoms.  Aspirate of the joint shows 74,000 white cells, almost all neutrophils and Gram stain positive, has obvious infection.      PAST MEDICAL HISTORY:  No major infection problems.  Generally healthy.  Chronic obesity and degenerative joint disease.      ALLERGIES:  NO ANTIMICROBIAL ALLERGIES.      MEDICATIONS:  As listed.      SOCIAL AND FAMILY HISTORY:  No recent travels or exposures.  No active alcohol or tobacco use.      REVIEW OF SYSTEMS:  Largely unremarkable except for the knee pain, some fever symptoms, chills, malaise, but not particularly prominent.  No active  respiratory symptoms, but the prior ones as listed.      PHYSICAL EXAMINATION:   GENERAL:  The patient appears his stated age.  He looks well.   VITAL SIGNS:  Normal, including being afebrile.   HEENT:  No thrush or intraoral lesions.  Pupils reactive.   NECK:  Supple and nontender.   HEART AND LUNGS:  Unremarkable.   ABDOMEN:  Soft, nontender.   EXTREMITIES:  Left knee with definite swelling compared to the right knee, mild erythema, slight warmth also noted.      LABORATORY DATA:  Inflammatory markers elevated.  Aspirate positive as above.  Blood cultures so far negative.      Thank you very much for this consultation.  I will follow the patient with you.         FRANCISCO HOLLINS MD             D: 2020   T: 2020   MT: OMAR      Name:     COCO ZUÑIGA   MRN:      [id number]        Account:       [id number]   :      1965           Consult Date:  2020      Document: [id number]

## 2020-04-13 NOTE — PLAN OF CARE
Pt A/O x 4, VSS, temp. 99.1. Voiding in adequate amounts using bedside commode. Telemetry - sinus rhythm. Pain managed w/IV Dilaudid 0.5. Transfers SBA w/GB and walker. CMS intact. NPO overnight changed to Regular diet d/t date change for surgery.

## 2020-04-13 NOTE — PLAN OF CARE
DX: Sepsis Left Knee infection  Tele: SR  A&O x4  Activity: SBA with walker and gait belt  Diet: Reg, to be npo at midnight  VSS  O2: RA  BG: na  PIV: NS infusing at 100mL/hr in RA  Pain: 8 out of 10 IV dilaudid 0.5mg x1   GI/: voiding appropriately  CMS: intact  Plan: IV Vanco and Ancef, I&D of Left knee tomorrow  Discharge: 2-3 days

## 2020-04-14 ENCOUNTER — HOME INFUSION (PRE-WILLOW HOME INFUSION) (OUTPATIENT)
Dept: PHARMACY | Facility: CLINIC | Age: 55
End: 2020-04-14

## 2020-04-14 ENCOUNTER — APPOINTMENT (OUTPATIENT)
Dept: GENERAL RADIOLOGY | Facility: CLINIC | Age: 55
DRG: 485 | End: 2020-04-14
Attending: ORTHOPAEDIC SURGERY
Payer: COMMERCIAL

## 2020-04-14 PROBLEM — Z96.659 S/P TOTAL KNEE ARTHROPLASTY: Status: ACTIVE | Noted: 2020-04-14

## 2020-04-14 LAB
CREAT SERPL-MCNC: 0.64 MG/DL (ref 0.52–1.04)
CREAT SERPL-MCNC: 0.72 MG/DL (ref 0.52–1.04)
GFR SERPL CREATININE-BSD FRML MDRD: >90 ML/MIN/{1.73_M2}
GFR SERPL CREATININE-BSD FRML MDRD: >90 ML/MIN/{1.73_M2}
GRAM STN SPEC: NORMAL
Lab: NORMAL
PLATELET # BLD AUTO: 226 10E9/L (ref 150–450)
SPECIMEN SOURCE: NORMAL
VANCOMYCIN SERPL-MCNC: 14.5 MG/L

## 2020-04-14 PROCEDURE — 99233 SBSQ HOSP IP/OBS HIGH 50: CPT | Performed by: HOSPITALIST

## 2020-04-14 PROCEDURE — 87205 SMEAR GRAM STAIN: CPT | Performed by: ORTHOPAEDIC SURGERY

## 2020-04-14 PROCEDURE — 25000128 H RX IP 250 OP 636: Performed by: INTERNAL MEDICINE

## 2020-04-14 PROCEDURE — 87070 CULTURE OTHR SPECIMN AEROBIC: CPT | Performed by: ORTHOPAEDIC SURGERY

## 2020-04-14 PROCEDURE — 25800030 ZZH RX IP 258 OP 636: Performed by: ORTHOPAEDIC SURGERY

## 2020-04-14 PROCEDURE — 25000132 ZZH RX MED GY IP 250 OP 250 PS 637: Performed by: ORTHOPAEDIC SURGERY

## 2020-04-14 PROCEDURE — 25800025 ZZH RX 258: Performed by: ORTHOPAEDIC SURGERY

## 2020-04-14 PROCEDURE — 82565 ASSAY OF CREATININE: CPT | Performed by: ORTHOPAEDIC SURGERY

## 2020-04-14 PROCEDURE — 0SUW09Z SUPPLEMENT LEFT KNEE JOINT, TIBIAL SURFACE WITH LINER, OPEN APPROACH: ICD-10-PCS | Performed by: ORTHOPAEDIC SURGERY

## 2020-04-14 PROCEDURE — C1763 CONN TISS, NON-HUMAN: HCPCS | Performed by: ORTHOPAEDIC SURGERY

## 2020-04-14 PROCEDURE — 25000128 H RX IP 250 OP 636: Performed by: NURSE ANESTHETIST, CERTIFIED REGISTERED

## 2020-04-14 PROCEDURE — 27211024 ZZHC OR SUPPLY OTHER OPNP: Performed by: ORTHOPAEDIC SURGERY

## 2020-04-14 PROCEDURE — 71000013 ZZH RECOVERY PHASE 1 LEVEL 1 EA ADDTL HR: Performed by: ORTHOPAEDIC SURGERY

## 2020-04-14 PROCEDURE — 37000009 ZZH ANESTHESIA TECHNICAL FEE, EACH ADDTL 15 MIN: Performed by: ORTHOPAEDIC SURGERY

## 2020-04-14 PROCEDURE — 0SBD0ZZ EXCISION OF LEFT KNEE JOINT, OPEN APPROACH: ICD-10-PCS | Performed by: ORTHOPAEDIC SURGERY

## 2020-04-14 PROCEDURE — 25000132 ZZH RX MED GY IP 250 OP 250 PS 637: Performed by: INTERNAL MEDICINE

## 2020-04-14 PROCEDURE — 36415 COLL VENOUS BLD VENIPUNCTURE: CPT | Performed by: ORTHOPAEDIC SURGERY

## 2020-04-14 PROCEDURE — 82565 ASSAY OF CREATININE: CPT | Performed by: INTERNAL MEDICINE

## 2020-04-14 PROCEDURE — 25000125 ZZHC RX 250: Performed by: INTERNAL MEDICINE

## 2020-04-14 PROCEDURE — 36000093 ZZH SURGERY LEVEL 4 1ST 30 MIN: Performed by: ORTHOPAEDIC SURGERY

## 2020-04-14 PROCEDURE — 25000125 ZZHC RX 250: Performed by: PHYSICIAN ASSISTANT

## 2020-04-14 PROCEDURE — 25000128 H RX IP 250 OP 636: Performed by: ANESTHESIOLOGY

## 2020-04-14 PROCEDURE — 37000008 ZZH ANESTHESIA TECHNICAL FEE, 1ST 30 MIN: Performed by: ORTHOPAEDIC SURGERY

## 2020-04-14 PROCEDURE — 80202 ASSAY OF VANCOMYCIN: CPT | Performed by: INTERNAL MEDICINE

## 2020-04-14 PROCEDURE — 25000125 ZZHC RX 250: Performed by: NURSE ANESTHETIST, CERTIFIED REGISTERED

## 2020-04-14 PROCEDURE — 25000128 H RX IP 250 OP 636: Performed by: ORTHOPAEDIC SURGERY

## 2020-04-14 PROCEDURE — 0SPD09Z REMOVAL OF LINER FROM LEFT KNEE JOINT, OPEN APPROACH: ICD-10-PCS | Performed by: ORTHOPAEDIC SURGERY

## 2020-04-14 PROCEDURE — 87176 TISSUE HOMOGENIZATION CULTR: CPT | Performed by: ORTHOPAEDIC SURGERY

## 2020-04-14 PROCEDURE — 25800030 ZZH RX IP 258 OP 636: Performed by: INTERNAL MEDICINE

## 2020-04-14 PROCEDURE — 87075 CULTR BACTERIA EXCEPT BLOOD: CPT | Performed by: ORTHOPAEDIC SURGERY

## 2020-04-14 PROCEDURE — 25800030 ZZH RX IP 258 OP 636: Performed by: ANESTHESIOLOGY

## 2020-04-14 PROCEDURE — 71000012 ZZH RECOVERY PHASE 1 LEVEL 1 FIRST HR: Performed by: ORTHOPAEDIC SURGERY

## 2020-04-14 PROCEDURE — C1776 JOINT DEVICE (IMPLANTABLE): HCPCS | Performed by: ORTHOPAEDIC SURGERY

## 2020-04-14 PROCEDURE — 36415 COLL VENOUS BLD VENIPUNCTURE: CPT | Performed by: INTERNAL MEDICINE

## 2020-04-14 PROCEDURE — 40000986 XR KNEE PORT LT 1/2 VW: Mod: LT

## 2020-04-14 PROCEDURE — 85049 AUTOMATED PLATELET COUNT: CPT | Performed by: ORTHOPAEDIC SURGERY

## 2020-04-14 PROCEDURE — 27210794 ZZH OR GENERAL SUPPLY STERILE: Performed by: ORTHOPAEDIC SURGERY

## 2020-04-14 PROCEDURE — 40000306 ZZH STATISTIC PRE PROC ASSESS II: Performed by: ORTHOPAEDIC SURGERY

## 2020-04-14 PROCEDURE — 25000132 ZZH RX MED GY IP 250 OP 250 PS 637: Performed by: PHYSICIAN ASSISTANT

## 2020-04-14 PROCEDURE — 87077 CULTURE AEROBIC IDENTIFY: CPT | Performed by: ORTHOPAEDIC SURGERY

## 2020-04-14 PROCEDURE — 12000000 ZZH R&B MED SURG/OB

## 2020-04-14 PROCEDURE — 36000063 ZZH SURGERY LEVEL 4 EA 15 ADDTL MIN: Performed by: ORTHOPAEDIC SURGERY

## 2020-04-14 DEVICE — IMPLANTABLE DEVICE: Type: IMPLANTABLE DEVICE | Site: KNEE | Status: FUNCTIONAL

## 2020-04-14 RX ORDER — OXYCODONE HYDROCHLORIDE 5 MG/1
5-10 TABLET ORAL
Status: DISCONTINUED | OUTPATIENT
Start: 2020-04-14 | End: 2020-04-16 | Stop reason: HOSPADM

## 2020-04-14 RX ORDER — POLYETHYLENE GLYCOL 3350 17 G/17G
17 POWDER, FOR SOLUTION ORAL DAILY
Status: DISCONTINUED | OUTPATIENT
Start: 2020-04-14 | End: 2020-04-16 | Stop reason: HOSPADM

## 2020-04-14 RX ORDER — ACETAMINOPHEN 325 MG/1
650 TABLET ORAL EVERY 4 HOURS PRN
Status: DISCONTINUED | OUTPATIENT
Start: 2020-04-17 | End: 2020-04-16 | Stop reason: HOSPADM

## 2020-04-14 RX ORDER — ONDANSETRON 2 MG/ML
4 INJECTION INTRAMUSCULAR; INTRAVENOUS EVERY 6 HOURS PRN
Status: DISCONTINUED | OUTPATIENT
Start: 2020-04-14 | End: 2020-04-16 | Stop reason: HOSPADM

## 2020-04-14 RX ORDER — CEFAZOLIN SODIUM 2 G/100ML
2 INJECTION, SOLUTION INTRAVENOUS EVERY 8 HOURS
Status: DISCONTINUED | OUTPATIENT
Start: 2020-04-14 | End: 2020-04-14

## 2020-04-14 RX ORDER — ONDANSETRON 4 MG/1
4 TABLET, ORALLY DISINTEGRATING ORAL EVERY 30 MIN PRN
Status: DISCONTINUED | OUTPATIENT
Start: 2020-04-14 | End: 2020-04-14 | Stop reason: HOSPADM

## 2020-04-14 RX ORDER — HYDROMORPHONE HYDROCHLORIDE 1 MG/ML
.3-.5 INJECTION, SOLUTION INTRAMUSCULAR; INTRAVENOUS; SUBCUTANEOUS EVERY 5 MIN PRN
Status: DISCONTINUED | OUTPATIENT
Start: 2020-04-14 | End: 2020-04-14 | Stop reason: HOSPADM

## 2020-04-14 RX ORDER — VANCOMYCIN HYDROCHLORIDE 1 G/20ML
INJECTION, POWDER, LYOPHILIZED, FOR SOLUTION INTRAVENOUS PRN
Status: DISCONTINUED | OUTPATIENT
Start: 2020-04-14 | End: 2020-04-14 | Stop reason: HOSPADM

## 2020-04-14 RX ORDER — SODIUM CHLORIDE, SODIUM LACTATE, POTASSIUM CHLORIDE, CALCIUM CHLORIDE 600; 310; 30; 20 MG/100ML; MG/100ML; MG/100ML; MG/100ML
INJECTION, SOLUTION INTRAVENOUS CONTINUOUS
Status: DISCONTINUED | OUTPATIENT
Start: 2020-04-14 | End: 2020-04-15

## 2020-04-14 RX ORDER — ONDANSETRON 4 MG/1
4 TABLET, ORALLY DISINTEGRATING ORAL EVERY 6 HOURS PRN
Status: DISCONTINUED | OUTPATIENT
Start: 2020-04-14 | End: 2020-04-16 | Stop reason: HOSPADM

## 2020-04-14 RX ORDER — SODIUM CHLORIDE 9 MG/ML
INJECTION, SOLUTION INTRAVENOUS CONTINUOUS
Status: DISCONTINUED | OUTPATIENT
Start: 2020-04-14 | End: 2020-04-15

## 2020-04-14 RX ORDER — PANTOPRAZOLE SODIUM 40 MG/1
40 TABLET, DELAYED RELEASE ORAL ONCE
Status: COMPLETED | OUTPATIENT
Start: 2020-04-14 | End: 2020-04-14

## 2020-04-14 RX ORDER — HYDROXYZINE HYDROCHLORIDE 25 MG/1
25 TABLET, FILM COATED ORAL EVERY 6 HOURS PRN
Status: DISCONTINUED | OUTPATIENT
Start: 2020-04-14 | End: 2020-04-16 | Stop reason: HOSPADM

## 2020-04-14 RX ORDER — FENTANYL CITRATE 50 UG/ML
25-50 INJECTION, SOLUTION INTRAMUSCULAR; INTRAVENOUS
Status: DISCONTINUED | OUTPATIENT
Start: 2020-04-14 | End: 2020-04-14 | Stop reason: HOSPADM

## 2020-04-14 RX ORDER — ONDANSETRON 2 MG/ML
4 INJECTION INTRAMUSCULAR; INTRAVENOUS EVERY 30 MIN PRN
Status: DISCONTINUED | OUTPATIENT
Start: 2020-04-14 | End: 2020-04-14 | Stop reason: HOSPADM

## 2020-04-14 RX ORDER — LIDOCAINE 40 MG/G
CREAM TOPICAL
Status: DISCONTINUED | OUTPATIENT
Start: 2020-04-14 | End: 2020-04-14

## 2020-04-14 RX ORDER — CEFAZOLIN SODIUM 1 G/3ML
1 INJECTION, POWDER, FOR SOLUTION INTRAMUSCULAR; INTRAVENOUS SEE ADMIN INSTRUCTIONS
Status: DISCONTINUED | OUTPATIENT
Start: 2020-04-14 | End: 2020-04-14 | Stop reason: CLARIF

## 2020-04-14 RX ORDER — ACETAMINOPHEN 325 MG/1
975 TABLET ORAL EVERY 8 HOURS
Status: DISCONTINUED | OUTPATIENT
Start: 2020-04-14 | End: 2020-04-16 | Stop reason: HOSPADM

## 2020-04-14 RX ORDER — BISACODYL 10 MG
10 SUPPOSITORY, RECTAL RECTAL DAILY PRN
Status: DISCONTINUED | OUTPATIENT
Start: 2020-04-14 | End: 2020-04-14

## 2020-04-14 RX ORDER — CEFAZOLIN SODIUM 2 G/100ML
2 INJECTION, SOLUTION INTRAVENOUS
Status: DISCONTINUED | OUTPATIENT
Start: 2020-04-14 | End: 2020-04-14 | Stop reason: CLARIF

## 2020-04-14 RX ORDER — PROPOFOL 10 MG/ML
INJECTION, EMULSION INTRAVENOUS PRN
Status: DISCONTINUED | OUTPATIENT
Start: 2020-04-14 | End: 2020-04-14

## 2020-04-14 RX ORDER — CELECOXIB 200 MG/1
200 CAPSULE ORAL 2 TIMES DAILY
Status: COMPLETED | OUTPATIENT
Start: 2020-04-14 | End: 2020-04-16

## 2020-04-14 RX ORDER — AMOXICILLIN 250 MG
2 CAPSULE ORAL 2 TIMES DAILY
Status: DISCONTINUED | OUTPATIENT
Start: 2020-04-14 | End: 2020-04-16 | Stop reason: HOSPADM

## 2020-04-14 RX ORDER — NALOXONE HYDROCHLORIDE 0.4 MG/ML
.1-.4 INJECTION, SOLUTION INTRAMUSCULAR; INTRAVENOUS; SUBCUTANEOUS
Status: DISCONTINUED | OUTPATIENT
Start: 2020-04-14 | End: 2020-04-14

## 2020-04-14 RX ORDER — SODIUM CHLORIDE, SODIUM LACTATE, POTASSIUM CHLORIDE, CALCIUM CHLORIDE 600; 310; 30; 20 MG/100ML; MG/100ML; MG/100ML; MG/100ML
INJECTION, SOLUTION INTRAVENOUS CONTINUOUS
Status: DISCONTINUED | OUTPATIENT
Start: 2020-04-14 | End: 2020-04-14 | Stop reason: HOSPADM

## 2020-04-14 RX ORDER — HYDROMORPHONE HYDROCHLORIDE 1 MG/ML
0.2 INJECTION, SOLUTION INTRAMUSCULAR; INTRAVENOUS; SUBCUTANEOUS
Status: DISCONTINUED | OUTPATIENT
Start: 2020-04-14 | End: 2020-04-16 | Stop reason: HOSPADM

## 2020-04-14 RX ORDER — PROPOFOL 10 MG/ML
INJECTION, EMULSION INTRAVENOUS CONTINUOUS PRN
Status: DISCONTINUED | OUTPATIENT
Start: 2020-04-14 | End: 2020-04-14

## 2020-04-14 RX ORDER — CELECOXIB 200 MG/1
400 CAPSULE ORAL ONCE
Status: COMPLETED | OUTPATIENT
Start: 2020-04-14 | End: 2020-04-14

## 2020-04-14 RX ADMIN — FENTANYL CITRATE 50 MCG: 50 INJECTION, SOLUTION INTRAMUSCULAR; INTRAVENOUS at 13:16

## 2020-04-14 RX ADMIN — HYDROMORPHONE HYDROCHLORIDE 0.5 MG: 1 INJECTION, SOLUTION INTRAMUSCULAR; INTRAVENOUS; SUBCUTANEOUS at 06:17

## 2020-04-14 RX ADMIN — VANCOMYCIN HYDROCHLORIDE 2000 MG: 10 INJECTION, POWDER, LYOPHILIZED, FOR SOLUTION INTRAVENOUS at 15:57

## 2020-04-14 RX ADMIN — CELECOXIB 200 MG: 200 CAPSULE ORAL at 19:18

## 2020-04-14 RX ADMIN — SENNOSIDES AND DOCUSATE SODIUM 2 TABLET: 8.6; 5 TABLET ORAL at 19:18

## 2020-04-14 RX ADMIN — SODIUM CHLORIDE, POTASSIUM CHLORIDE, SODIUM LACTATE AND CALCIUM CHLORIDE: 600; 310; 30; 20 INJECTION, SOLUTION INTRAVENOUS at 11:40

## 2020-04-14 RX ADMIN — PROPOFOL 200 MG: 10 INJECTION, EMULSION INTRAVENOUS at 10:02

## 2020-04-14 RX ADMIN — Medication 100 MG: at 10:02

## 2020-04-14 RX ADMIN — HYDROMORPHONE HYDROCHLORIDE 0.5 MG: 1 INJECTION, SOLUTION INTRAMUSCULAR; INTRAVENOUS; SUBCUTANEOUS at 12:59

## 2020-04-14 RX ADMIN — HYDROMORPHONE HYDROCHLORIDE 0.5 MG: 1 INJECTION, SOLUTION INTRAMUSCULAR; INTRAVENOUS; SUBCUTANEOUS at 04:11

## 2020-04-14 RX ADMIN — MIDAZOLAM 2 MG: 1 INJECTION INTRAMUSCULAR; INTRAVENOUS at 09:50

## 2020-04-14 RX ADMIN — SODIUM CHLORIDE: 9 INJECTION, SOLUTION INTRAVENOUS at 15:56

## 2020-04-14 RX ADMIN — OXYCODONE HYDROCHLORIDE 10 MG: 5 TABLET ORAL at 01:19

## 2020-04-14 RX ADMIN — HYDROMORPHONE HYDROCHLORIDE 0.5 MG: 1 INJECTION, SOLUTION INTRAMUSCULAR; INTRAVENOUS; SUBCUTANEOUS at 10:26

## 2020-04-14 RX ADMIN — VANCOMYCIN HYDROCHLORIDE 2000 MG: 10 INJECTION, POWDER, LYOPHILIZED, FOR SOLUTION INTRAVENOUS at 01:56

## 2020-04-14 RX ADMIN — ACETAMINOPHEN 975 MG: 325 TABLET, FILM COATED ORAL at 22:03

## 2020-04-14 RX ADMIN — HYDROMORPHONE HYDROCHLORIDE 0.5 MG: 1 INJECTION, SOLUTION INTRAMUSCULAR; INTRAVENOUS; SUBCUTANEOUS at 13:34

## 2020-04-14 RX ADMIN — OXYCODONE HYDROCHLORIDE 10 MG: 5 TABLET ORAL at 05:12

## 2020-04-14 RX ADMIN — ONDANSETRON 4 MG: 2 INJECTION INTRAMUSCULAR; INTRAVENOUS at 11:25

## 2020-04-14 RX ADMIN — CYCLOBENZAPRINE HYDROCHLORIDE 10 MG: 10 TABLET, FILM COATED ORAL at 04:11

## 2020-04-14 RX ADMIN — FENTANYL CITRATE 50 MCG: 50 INJECTION, SOLUTION INTRAMUSCULAR; INTRAVENOUS at 12:46

## 2020-04-14 RX ADMIN — LIDOCAINE HYDROCHLORIDE 30 MG: 10 INJECTION, SOLUTION INFILTRATION; PERINEURAL at 10:02

## 2020-04-14 RX ADMIN — CEFTRIAXONE 2 G: 2 INJECTION, POWDER, FOR SOLUTION INTRAMUSCULAR; INTRAVENOUS at 19:12

## 2020-04-14 RX ADMIN — HYDROMORPHONE HYDROCHLORIDE 0.5 MG: 1 INJECTION, SOLUTION INTRAMUSCULAR; INTRAVENOUS; SUBCUTANEOUS at 12:08

## 2020-04-14 RX ADMIN — PROPOFOL 75 MCG/KG/MIN: 10 INJECTION, EMULSION INTRAVENOUS at 10:10

## 2020-04-14 RX ADMIN — ROCURONIUM BROMIDE 10 MG: 10 INJECTION INTRAVENOUS at 10:02

## 2020-04-14 RX ADMIN — BUPROPION HYDROCHLORIDE 300 MG: 150 TABLET, EXTENDED RELEASE ORAL at 19:18

## 2020-04-14 RX ADMIN — HYDROMORPHONE HYDROCHLORIDE 0.5 MG: 1 INJECTION, SOLUTION INTRAMUSCULAR; INTRAVENOUS; SUBCUTANEOUS at 12:00

## 2020-04-14 RX ADMIN — CELECOXIB 400 MG: 200 CAPSULE ORAL at 09:47

## 2020-04-14 RX ADMIN — OXYCODONE HYDROCHLORIDE 10 MG: 5 TABLET ORAL at 19:18

## 2020-04-14 RX ADMIN — Medication 1 G: at 10:07

## 2020-04-14 RX ADMIN — OXYCODONE HYDROCHLORIDE 10 MG: 5 TABLET ORAL at 15:56

## 2020-04-14 RX ADMIN — HYDROMORPHONE HYDROCHLORIDE 0.5 MG: 1 INJECTION, SOLUTION INTRAMUSCULAR; INTRAVENOUS; SUBCUTANEOUS at 08:16

## 2020-04-14 RX ADMIN — FENTANYL CITRATE 50 MCG: 50 INJECTION, SOLUTION INTRAMUSCULAR; INTRAVENOUS at 12:25

## 2020-04-14 RX ADMIN — HYDROMORPHONE HYDROCHLORIDE 0.5 MG: 1 INJECTION, SOLUTION INTRAMUSCULAR; INTRAVENOUS; SUBCUTANEOUS at 01:40

## 2020-04-14 RX ADMIN — OXYCODONE HYDROCHLORIDE 10 MG: 5 TABLET ORAL at 22:06

## 2020-04-14 RX ADMIN — FENTANYL CITRATE 100 MCG: 50 INJECTION, SOLUTION INTRAMUSCULAR; INTRAVENOUS at 10:02

## 2020-04-14 RX ADMIN — Medication 1 G: at 11:42

## 2020-04-14 RX ADMIN — ACETAMINOPHEN 975 MG: 325 TABLET, FILM COATED ORAL at 15:56

## 2020-04-14 RX ADMIN — FENTANYL CITRATE 50 MCG: 50 INJECTION, SOLUTION INTRAMUSCULAR; INTRAVENOUS at 12:19

## 2020-04-14 RX ADMIN — POLYETHYLENE GLYCOL 3350 17 G: 17 POWDER, FOR SOLUTION ORAL at 17:47

## 2020-04-14 RX ADMIN — HYDROMORPHONE HYDROCHLORIDE 0.5 MG: 1 INJECTION, SOLUTION INTRAMUSCULAR; INTRAVENOUS; SUBCUTANEOUS at 12:31

## 2020-04-14 RX ADMIN — SODIUM CHLORIDE, POTASSIUM CHLORIDE, SODIUM LACTATE AND CALCIUM CHLORIDE: 600; 310; 30; 20 INJECTION, SOLUTION INTRAVENOUS at 09:25

## 2020-04-14 RX ADMIN — PANTOPRAZOLE SODIUM 40 MG: 40 TABLET, DELAYED RELEASE ORAL at 09:46

## 2020-04-14 ASSESSMENT — ACTIVITIES OF DAILY LIVING (ADL)
ADLS_ACUITY_SCORE: 13

## 2020-04-14 ASSESSMENT — ENCOUNTER SYMPTOMS: DYSRHYTHMIAS: 0

## 2020-04-14 NOTE — OP NOTE
Procedure Date: 04/14/2020      DIAGNOSIS:  Septic left total knee arthroplasty.      POSTOPERATIVE DIAGNOSIS:  Septic left total knee arthroplasty.      PROCEDURE:  Left knee irrigation and debridement with exchange of polyethylene.      SURGEON:  Michael Trimble MD      FIRST ASSISTANT:  Joann Bernal PA-C      INDICATION FOR SURGERY:  Ms. Prajapati is a very pleasant 55-year-old female who 7 years ago underwent bilateral total knee arthroplasties.  She has done extremely well from this and was doing well up until 3 days ago when she had acute swelling and onset of pain in her left knee.  This progressively got worse over the last few days, at which point she was seen and had her knee aspirated.  Aspirate showed 74,000 cells, 96% polymorphonucleocytes and culture grew Streptococcus mitis group.  She was admitted to [facility].  After I evaluated her, I discussed her situation.  Given that this appears to be acute onset of an infection in the left total knee arthroplasty, which has otherwise been doing well, x-rays looked good.  We discussed treatment with irrigation, debridement and exchange of polyethylene.  I discussed that I would also place antibiotic-impregnated absorbable cement beads, as well as powder antibiotic.  She understands all this and is happy with this plan of care.  We will plan for 6 weeks of IV antibiotics following surgery.      NARRATIVE EVENTS:  After thorough preoperative evaluation and proper identification of patient's extremity to be operated on, Ms. Prajapati was taken to the operating room where she underwent a general anesthetic, placed supine on the operating table.  Left leg was prepped and draped in the usual sterile manner.  After appropriate surgical pause to confirm the patient's extremity to be operated on, 30 minutes after the patient received 2 grams of Ancef, her left leg was exsanguinated and tourniquet was raised to 300 mmHg on her left upper thigh.  We approached  the left knee through her previous incision.  The skin and soft tissue were sharply dissected down to the patella where a median parapatellar arthrotomy was performed.  Immediately upon doing this, we encountered a significant amount of purulent synovial fluid.  A culture of this was obtained and sent.  We then exposed the knee.  I did an extensive synovectomy.  Three samples of synovial tissue were also sent for culture.  At this point, we removed the polyethylene component.  This was a size 3 x 12 [employer] medial pivot knee.  The polyethylene removed quite easily.  Implants were in good condition and good position, so these were left intact.  We completed a posterior synovectomy, and then we irrigated the knee first with a liter of saline followed by a liter of Bactisure irrigant followed by 3 liters of saline.  At this point, we then reimplanted a size 3 x 12 mm thick polyethylene insert.  This gave the knee good stability and full range of motion.  We then placed 10 mL of bio-absorbable Stimulan beads with 1 gram of vancomycin followed by 1 gram of powdered vancomycin into the knee wound.  We then closed the joint capsule over a drain.  We closed the arthrotomy with 0 looped PDS sutures.  We closed the skin and soft tissues with absorbable sutures and staples in the skin.  The patient was placed in a well-padded postoperative dressing.  She was taken to the recovery room in stable condition.  She tolerated the procedure without difficulty.        We will have her placed on IV antibiotics following susceptibilities of the cultures for 6 weeks followed by likely oral antibiotics for a time period.  She tolerated the procedure without difficulty.         QUINN STEINER MD             D: 2020   T: 2020   MT: BRIGETTE      Name:     COCO ZUÑIGA   MRN:      [id number]        Account:        [id number]   :      1965           Procedure Date: 2020      Document:  [id number]

## 2020-04-14 NOTE — BRIEF OP NOTE
[facility]    Brief Operative Note    Pre-operative diagnosis: Infection of total knee replacement (H) [T84.59XA, Z96.659]  Post-operative diagnosis Same as pre-operative diagnosis    Procedure: Procedure(s):  Irrigation and debridement of left knee with poly exchange  Surgeon: Surgeon(s) and Role:     * Michael Trimble MD - Primary     * Joann Bernal PA-C - Assisting  Anesthesia: General   Estimated blood loss: Less than 100 ml  Drains: Hemovac  Specimens:   ID Type Source Tests Collected by Time Destination   1 : left knee fluid Fluid Knee, Left ANAEROBIC BACTERIAL CULTURE, FLUID CULTURE AEROBIC BACTERIAL, GRAM STAIN Michael Trimble MD 4/14/2020 10:48 AM    2 : left knee TISSUE  # 1 Tissue Knee, Left ANAEROBIC BACTERIAL CULTURE, GRAM STAIN, TISSUE CULTURE AEROBIC BACTERIAL Michael Trimble MD 4/14/2020 10:48 AM    3 : left knee TISSUE  # 2 Tissue Knee, Left ANAEROBIC BACTERIAL CULTURE, GRAM STAIN, TISSUE CULTURE AEROBIC BACTERIAL Michael Trimble MD 4/14/2020 10:48 AM    4 : left knee TISSUE  # 3 Tissue Knee, Left ANAEROBIC BACTERIAL CULTURE, GRAM STAIN, TISSUE CULTURE AEROBIC BACTERIAL Michael Trimble MD 4/14/2020 10:48 AM      Findings:   None.  Complications: None.  Implants:   Implant Name Type Inv. Item Serial No.  Lot No. LRB No. Used Action   IMP TIBIAL [employer] MEDIAL PIVOT ADV II SZ3 12MM LT [id number]  IMP TIBIAL [employer] MEDIAL PIVOT ADV II SZ3 12MM LT [id number]  [employer] MEDICAL TECHN [id number] Left 1 Explanted   IMP TIBIAL [employer] MEDIAL PIVOT ADV II SZ3 12MM LT [id number] Total Joint Component/Insert IMP TIBIAL STEVENSON MEDIAL PIVOT ADV II SZ3 12MM LT [id number]  [employer] MEDICAL TECHN [id number] Left 1 Implanted   STIMULAN  BEADS     [id number] Left 1 Implanted

## 2020-04-14 NOTE — PROGRESS NOTES
Therapy: IV abx  Insurance: [employer]  Ded: $1000  Met: $1000   Co-Insurance: 80/20  Max Out of Pocket: $4000   Met: $1130    In reference to admission on 4/12/20 to check IV abx coverage    Please contact Intake with any questions, [phone] or In Basket pool, [facility] ([phone]).

## 2020-04-14 NOTE — PLAN OF CARE
Pt A/O x4. Afebrile. Oxycodone, IV Dilaudid and Flexeril given for pain. NPO. Voiding in good amounts. Shower this am.tele- normal SR. Up with assist of 1 with walker and gait belt. Discharge in 2-3 days, will continue to monitor.

## 2020-04-14 NOTE — PLAN OF CARE
Day RN  100.1 temp today around 8am, other vss.   CMS+ +3 swelling in L knee and +2 below the knee  Up with SBA gait belt and walker, walked into bathroom, indep in bed to reposition.  Needs assistance lifting L leg    Iv dilaudid for pain   Voiding well. Las bm 2 days ago  Rested between cares    NPO for surgery left for OR around 10am, marlena wipes done, preop shower done on nights. \  Came back from OR around 1430 A and o, voided. Pain 5 in L knee declined meds    vss 02 at 2L. No nausea

## 2020-04-14 NOTE — PROGRESS NOTES
"[facility]  Infectious Disease Progress Note          Assessment and Plan:   IMPRESSION:   1.  Healthy 55-year-old female with a late, relatively acute left total knee arthroplasty infection, obvious infection and Gram stain positive, full culture pending, no major clinical sepsis.   2.  Obesity.   3.  Degenerative arthritis of bilateral knee replacements with no prior infection history.      RECOMMENDATIONS:   1.  Continue vanco and  ceftriaxone. Assuming strep mitis alone discontinue vanco soon  2.  Await full culture information and operative findings per Dr. Trimble. At op,  Plan is an exchange arthroplasty, as such likely 6 weeks IV followed by 3 months of intensive oral and then discussion on long-term possible suppression        Interval History:   no new complaints and doing well; no cp, sob, n/v/d, or abd pain. In OR cx strap mitis              Medications:       [Auto Hold] buPROPion  300 mg Oral QPM     [Auto Hold] cefTRIAXone  2 g Intravenous Q24H     sodium chloride (PF)  3 mL Intracatheter Q8H     [Auto Hold] sodium chloride (PF)  3 mL Intracatheter Q8H     [Auto Hold] sodium chloride (PF)  3 mL Intracatheter Q8H     tranexamic acid  1 g Intravenous Once     [Auto Hold] vancomycin (VANCOCIN) IV  2,000 mg Intravenous Q12H                  Physical Exam:   Blood pressure 124/65, pulse 84, temperature 99.6  F (37.6  C), temperature source Temporal, resp. rate 12, height 1.702 m (5' 7\"), weight 99.8 kg (220 lb), last menstrual period 10/13/2016, SpO2 100 %, not currently breastfeeding.  Wt Readings from Last 2 Encounters:   04/12/20 99.8 kg (220 lb)   10/28/16 81.6 kg (180 lb)     Vital Signs with Ranges  Temp:  [98.9  F (37.2  C)-100.3  F (37.9  C)] 99.6  F (37.6  C)  Pulse:  [] 84  Heart Rate:  [] 99  Resp:  [7-16] 12  BP: (116-158)/() 124/65  SpO2:  [93 %-100 %] 100 %    Constitutional: Awake, alert, cooperative, no apparent distress   Lungs: Clear to " auscultation bilaterally, no crackles or wheezing   Cardiovascular: Regular rate and rhythm, normal S1 and S2, and no murmur noted   Abdomen: Normal bowel sounds, soft, non-distended, non-tender   Skin: No rashes, no cyanosis, no edema   Other:           Data:   All microbiology laboratory data reviewed.  Recent Labs   Lab Test 04/13/20  0704 04/12/20  1205 10/21/16  1950   WBC 9.9 14.8* 8.8   HGB 13.0 13.6 13.3   HCT 40.8 41.7 39.8   MCV 95 93 92    268 268     Recent Labs   Lab Test 04/14/20  0657 04/13/20  0704 04/12/20  1205   CR 0.64 0.69 0.71     Recent Labs   Lab Test 04/12/20  1205   SED 18     Recent Labs   Lab Test 04/12/20  1850 04/12/20  1356 04/12/20  1319 04/12/20  1205   CULT No growth On day 1, isolated in broth only:  Streptococcus mitis group  Susceptibility testing in progress  *  Critical Value/Significant Value, preliminary result only, called to and read back by  Dianelys Lugo RN 4.13.20 1042. ARMAND   No growth after 2 days No growth after 2 days

## 2020-04-14 NOTE — PLAN OF CARE
Pt is AOx4, Ax1, GB and walker to transfer, VSS. Bilateral lungs clear bilaterally, bowels active x4, + flatus. Pt continues IV vanco and IV Rocephin for left knee infection. PRN oxycodone, PRN IV dilaudid and ice pack effective for pain control Pt is tolerating a regular diet without nausea and drinking sufficient amounts of fluids. Pt is voiding adequate amounts of urine. Pt is to be NPO at midnight for I&D tomorrow. Will continue to monitor and encourage fluids.

## 2020-04-14 NOTE — ANESTHESIA POSTPROCEDURE EVALUATION
Patient: Annemarie Prajapati    Procedure(s):  Irrigation and debridement of left knee with poly exchange    Diagnosis:Infection of total knee replacement (H) [T84.59XA, Z96.659]  Diagnosis Additional Information: No value filed.    Anesthesia Type:  General    Note:  Anesthesia Post Evaluation    Patient location during evaluation: PACU  Patient participation: Able to fully participate in evaluation  Level of consciousness: awake  Pain management: adequate  Airway patency: patent  Cardiovascular status: acceptable  Respiratory status: acceptable  Hydration status: euvolemic  PONV: controlled     Anesthetic complications: None          Last vitals:  Vitals:    04/14/20 1220 04/14/20 1225 04/14/20 1230   BP: (!) 156/93 (!) 143/93 (!) 139/96   Pulse: 97 96 82   Resp: 9 10 9   Temp:      SpO2: 100% 99% 99%         Electronically Signed By: Kamaljit Mitchell MD  April 14, 2020  12:45 PM

## 2020-04-14 NOTE — PHARMACY-VANCOMYCIN DOSING SERVICE
Pharmacy Vancomycin Note  Date of Service 2020  Patient's  1965   55 year old, female    Indication: Bone and Joint Infection  Goal Trough Level: 15-20 mg/L  Day of Therapy: 2  Current Vancomycin regimen:  2000 mg IV q12h    Current estimated CrCl = Estimated Creatinine Clearance: 122 mL/min (based on SCr of 0.64 mg/dL).    Creatinine for last 3 days  2020: 12:05 PM Creatinine 0.71 mg/dL  2020:  7:04 AM Creatinine 0.69 mg/dL  2020:  6:57 AM Creatinine 0.64 mg/dL    Recent Vancomycin Levels (past 3 days)  2020: 12:59 PM Vancomycin Level 14.5 mg/L    Vancomycin IV Administrations (past 72 hours)                   vancomycin (VANCOCIN) 2,000 mg in sodium chloride 0.9 % 500 mL intermittent infusion (mg) 2,000 mg Given 20 0156     2,000 mg Given 20 1443     2,000 mg Given  0358    vancomycin (VANCOCIN) 2,000 mg in sodium chloride 0.9 % 500 mL intermittent infusion (mg) 2,000 mg Given 20 1501                Nephrotoxins and other renal medications (From now, onward)    Start     Dose/Rate Route Frequency Ordered Stop    20 0200  vancomycin (VANCOCIN) 2,000 mg in sodium chloride 0.9 % 500 mL intermittent infusion      2,000 mg  over 2 Hours Intravenous EVERY 12 HOURS 20 1731               Contrast Orders - past 72 hours (72h ago, onward)    None          Interpretation of levels and current regimen:  Trough level is  Therapeutic    Has serum creatinine changed > 50% in last 72 hours: No    Urine output:  good urine output    Renal Function: Stable    Plan:  1.  Continue Current Dose  2.  Pharmacy will check trough levels as appropriate in 1-3 Days.            .

## 2020-04-14 NOTE — ANESTHESIA PREPROCEDURE EVALUATION
Anesthesia Pre-Procedure Evaluation    Patient: Annemarie Prajapati   MRN: [id number] : 1965          Preoperative Diagnosis: Infection of total knee replacement (H) [T84.59XA, Z96.659]    Procedure(s):  Irrigation and debridement of left knee with poly exchange    Past Medical History:   Diagnosis Date     Depression      History of mononucleosis      Knee pain, bilateral      Migraine      Past Surgical History:   Procedure Laterality Date     ARTHROPLASTY KNEE BILATERAL  2013    Procedure: ARTHROPLASTY KNEE BILATERAL;  Bilateral Total Knee Arthroplasty  Pt also recieved an epidural.  Left TKA started at 0804,end at 0941  Right TKA incision at 0948, ended at 1137;  Surgeon: Michael Trimble MD;  Location: [facility] OR     HC TOOTH EXTRACTION W/FORCEP      wisdom teeth     Anesthesia Evaluation     .             ROS/MED HX    ENT/Pulmonary:      (-) asthma and sleep apnea   Neurologic:     (+)migraines,     Cardiovascular:        (-) hypertension, CAD, CHF, arrhythmias, pulmonary hypertension and dyslipidemia   METS/Exercise Tolerance:     Hematologic:        (-) anemia   Musculoskeletal:   (+) arthritis,  -       GI/Hepatic:        (-) GERD, hiatal hernia and hepatitis   Renal/Genitourinary:      (-) renal disease   Endo:     (+) Obesity, .   (-) Type I DM, Type II DM, thyroid disease, chronic steroid usage and other endocrine disorder   Psychiatric:     (+) psychiatric history depression      Infectious Disease:  - neg infectious disease ROS       Malignancy:      - no malignancy   Other:    - neg other ROS                      Physical Exam      Airway   Mallampati: II  TM distance: >3 FB  Neck ROM: full    Dental     Cardiovascular   Rhythm and rate: regular and normal  (-) no murmur    Pulmonary    breath sounds clear to auscultation    Other findings: Lab Test        04/13/20     04/12/20     10/21/16                       0704          1205          1950          WBC          9.9          14.8*         8.8           HGB          13.0         13.6         13.3          MCV          95           93           92            PLT          221          268          268            Lab Test        04/14/20     04/13/20     04/12/20     10/21/16                       0657          0704          1205          1950          NA            --          139          136          137           POTASSIUM     --          4.0          3.8          3.7           CHLORIDE      --          111*         106          103           CO2           --          25           26           25            BUN           --          8            11           12            CR           0.64         0.69         0.71         0.94          ANIONGAP      --          3            4            9             VINOD           --          8.2*         8.7          8.6           GLC           --          108*         147*         91                  Lab Results   Component Value Date    WBC 9.9 04/13/2020    HGB 13.0 04/13/2020    HCT 40.8 04/13/2020     04/13/2020    .0 (H) 04/12/2020    SED 18 04/12/2020     04/13/2020    POTASSIUM 4.0 04/13/2020    CHLORIDE 111 (H) 04/13/2020    CO2 25 04/13/2020    BUN 8 04/13/2020    CR 0.64 04/14/2020     (H) 04/13/2020    VINOD 8.2 (L) 04/13/2020    ALBUMIN 3.8 10/21/2016    PROTTOTAL 7.2 10/21/2016    ALT 19 10/21/2016    AST 16 10/21/2016    ALKPHOS 64 10/21/2016    BILITOTAL 0.9 10/21/2016    LIPASE 268 10/21/2016    TSH 1.12 04/12/2020       Preop Vitals  BP Readings from Last 3 Encounters:   04/14/20 131/79   10/28/16 122/76   10/21/16 129/79    Pulse Readings from Last 3 Encounters:   04/14/20 97   10/28/16 85   10/21/16 64      Resp Readings from Last 3 Encounters:   04/14/20 16   10/21/16 18   05/19/14 18    SpO2 Readings from Last 3 Encounters:   04/14/20 95%   10/28/16 97%   10/21/16 95%      Temp Readings from Last 1 Encounters:   04/14/20 98.9  F (37.2  C) (Temporal)    Ht Readings  "from Last 1 Encounters:   04/14/20 1.702 m (5' 7\")      Wt Readings from Last 1 Encounters:   04/12/20 99.8 kg (220 lb)    Estimated body mass index is 34.46 kg/m  as calculated from the following:    Height as of this encounter: 1.702 m (5' 7\").    Weight as of this encounter: 99.8 kg (220 lb).       Anesthesia Plan      History & Physical Review  History and physical reviewed and following examination; no interval change.    ASA Status:  2 .    NPO Status:  > 8 hours    Plan for General with Propofol induction. Maintenance will be Balanced.    PONV prophylaxis:  Ondansetron (or other 5HT-3)  Propofol gtt + Sevo + 60% O2 please        Postoperative Care      Consents  Anesthetic plan, risks, benefits and alternatives discussed with:  Patient..                 Kamaljit Mitchell MD                    .  "

## 2020-04-14 NOTE — PROGRESS NOTES
[facility]    Hospitalist Progress Note    Date of Service (when I saw the patient): 04/14/2020  Provider:  Nash Lopez MD   Text Page  7am - 6PM       Assessment & Plan   Annemarie Prajapati is a 55 year old female admitted on 4/12/2020. She has a past medical history significant for migraine headaches, depression, osteoarthritis, and latent tuberculosis.  She presented to emergency room with left knee pain and swelling.  Found to have suspected infected left knee arthroplasty.     1.  Sepsis secondary to acutely infected left knee arthroplasty s/p irrigation an debridement with exchange of polyethylene. POD #0.   [place] fluid 4/12 growing strept mittis  Appreciate orthopedic surgery involvement and ID recommendations.   -On  IV cefazolin and vancomycin.      -Pain medications as needed.   -Incentive spirometry.     - Post surgical care per ortho ream.   - Long term plan is 4 weeks of ABX and 12 weeks of oral ABX. She might need suppressive therapy.      2.  Acute streptoccocal infection of left knee hard ware.  Continue IV vancomycin and cefazolin. Await cultures final results. Pain meds prn.      3.  Depression. Wellbutrin.     4.  Possible syncopal event. Associated to acme of knee pain, suspect vasovagal. Monitor on telemetry.  Normal echocardiogram.     5.  Sinus tachycardia.  Likely [pain driven. Monitor on telemetry. Normal TSH level.     Leo Catheter: removed after surgery  Diet: Regular diet.    DVT Prophylaxis: Pneumatic Compression Devices  Code Status: Full Code    Disposition: Expected discharge in 2 - 3 days once outpatient plan in place. .     Interval History   Arrived from PACU, denies chest pain, shortness of breath, nausea or vomiting.  She had already tolerated p.o. intake and ordered lunch.  , Pain is controlled at the moment. It was not well controlled overnight. No fever, no other concerns.       -Data reviewed today: I reviewed all new labs and imaging results over the  last 24 hours. I personally reviewed the EKG tracing showing Sinus in monitor .    Physical Exam   Temp: 99.7  F (37.6  C) Temp src: Tympanic BP: 133/68 Pulse: 83 Heart Rate: 98 Resp: 16 SpO2: 96 % O2 Device: Nasal cannula Oxygen Delivery: 2 LPM  Vitals:    04/12/20 1152   Weight: 99.8 kg (220 lb)     Vital Signs with Ranges  Temp:  [98.9  F (37.2  C)-100.3  F (37.9  C)] 99.7  F (37.6  C)  Pulse:  [] 83  Heart Rate:  [] 98  Resp:  [7-16] 16  BP: (111-158)/() 133/68  SpO2:  [93 %-100 %] 96 %  I/O last 3 completed shifts:  In: 3652 [P.O.:120; I.V.:3532]  Out: 2930 [Urine:2800; Drains:30; Blood:100]    GEN:  Alert, oriented x 3, appears comfortable, NAD.  HEENT:  Normocephalic/atraumatic, no scleral icterus, no nasal discharge, mouth moist.  CV:  Regular rate and rhythm, no murmur or JVD.  S1 + S2 noted, no S3 or S4.  LUNGS:  Clear to auscultation bilaterally without rales/rhonchi/wheezing/retractions.  Symmetric chest rise on inhalation noted.  ABD:  Active bowel sounds, soft, non-tender/non-distended.  No rebound/guarding/rigidity.  EXT:  Left knee is wrapped with an surgical bandage, no active bleeding seen, Hemovac in place with 30 mL's of bloody return.  Distal sensitivity and motility of the foot preserved.. No edema or cyanosis.  No joint synovitis noted.  SKIN:  Dry to touch, no exanthems noted in the visualized areas.       Medications     lactated ringers       sodium chloride         acetaminophen  975 mg Oral Q8H     buPROPion  300 mg Oral QPM     cefTRIAXone  2 g Intravenous Q24H     celecoxib  200 mg Oral BID     [START ON 4/15/2020] enoxaparin ANTICOAGULANT  40 mg Subcutaneous Q24H     polyethylene glycol  17 g Oral Daily     senna-docusate  2 tablet Oral BID     sodium chloride (PF)  3 mL Intracatheter Q8H     sodium chloride (PF)  3 mL Intracatheter Q8H     tranexamic acid  1 g Intravenous Once     vancomycin (VANCOCIN) IV  2,000 mg Intravenous Q12H       Data   Recent Labs   Lab  04/14/20  0657 04/13/20  0704 04/12/20  1205   WBC  --  9.9 14.8*   HGB  --  13.0 13.6   MCV  --  95 93   PLT  --  221 268   NA  --  139 136   POTASSIUM  --  4.0 3.8   CHLORIDE  --  111* 106   CO2  --  25 26   BUN  --  8 11   CR 0.64 0.69 0.71   ANIONGAP  --  3 4   VINOD  --  8.2* 8.7   GLC  --  108* 147*       Recent Results (from the past 24 hour(s))   XR Knee Port Left 1/2 Views    Narrative    LEFT KNEE PORTABLE ONE TO TWO VIEWS  4/14/2020 12:39 PM     HISTORY: Postoperative.    COMPARISON: 4/12/2020.      Impression    IMPRESSION: Interval placement of intra-articular antibiotic beads. A  surgical drain, skin staples and postoperative soft tissue air are  noted. No change in appearance of left knee total arthroplasty.       Disclaimer: This note consists of symbols derived from keyboarding, dictation and/or voice recognition software. As a result, there may be errors in the script that have gone undetected. Please consider this when interpreting information found in this chart.

## 2020-04-14 NOTE — PROGRESS NOTES
"Per chart review, pt will likely need 6 weeks of IV ABX at discharge. Benefit check completed with [facility], per [facility] intake:     \"Pt has a [employer] plan with a ded $1000 (met all), then 80/20 coverage up to max OOP $4000 ($1130 met so far). Once OOP is met, coverage will be at 100%.\"    Have not yet met w/ pt to discuss as she was at I&D most of the day, now recovering on unit but noted to be lethargic. CM will continue to follow for discharge planning and review coverage/options on 4/15.     CM will continue to follow patient for any additional discharge needs.     Samra Wilson RN BSN   Inpatient Care Coordination  [facility]   Phone [phone]    "

## 2020-04-15 ENCOUNTER — APPOINTMENT (OUTPATIENT)
Dept: PHYSICAL THERAPY | Facility: CLINIC | Age: 55
DRG: 485 | End: 2020-04-15
Attending: ORTHOPAEDIC SURGERY
Payer: COMMERCIAL

## 2020-04-15 LAB
ANION GAP SERPL CALCULATED.3IONS-SCNC: 3 MMOL/L (ref 3–14)
BASOPHILS # BLD AUTO: 0 10E9/L (ref 0–0.2)
BASOPHILS NFR BLD AUTO: 0.3 %
BUN SERPL-MCNC: 7 MG/DL (ref 7–30)
CALCIUM SERPL-MCNC: 8.3 MG/DL (ref 8.5–10.1)
CHLORIDE SERPL-SCNC: 109 MMOL/L (ref 94–109)
CO2 SERPL-SCNC: 27 MMOL/L (ref 20–32)
CREAT SERPL-MCNC: 0.7 MG/DL (ref 0.52–1.04)
CRP SERPL-MCNC: 203 MG/L (ref 0–8)
DIFFERENTIAL METHOD BLD: ABNORMAL
EOSINOPHIL # BLD AUTO: 0.2 10E9/L (ref 0–0.7)
EOSINOPHIL NFR BLD AUTO: 3.3 %
ERYTHROCYTE [DISTWIDTH] IN BLOOD BY AUTOMATED COUNT: 13.4 % (ref 10–15)
GFR SERPL CREATININE-BSD FRML MDRD: >90 ML/MIN/{1.73_M2}
GLUCOSE SERPL-MCNC: 97 MG/DL (ref 70–99)
HCT VFR BLD AUTO: 32.2 % (ref 35–47)
HGB BLD-MCNC: 10.1 G/DL (ref 11.7–15.7)
IMM GRANULOCYTES # BLD: 0 10E9/L (ref 0–0.4)
IMM GRANULOCYTES NFR BLD: 0.5 %
LYMPHOCYTES # BLD AUTO: 1.2 10E9/L (ref 0.8–5.3)
LYMPHOCYTES NFR BLD AUTO: 20.3 %
MCH RBC QN AUTO: 30.1 PG (ref 26.5–33)
MCHC RBC AUTO-ENTMCNC: 31.4 G/DL (ref 31.5–36.5)
MCV RBC AUTO: 96 FL (ref 78–100)
MONOCYTES # BLD AUTO: 0.5 10E9/L (ref 0–1.3)
MONOCYTES NFR BLD AUTO: 8.6 %
NEUTROPHILS # BLD AUTO: 4.1 10E9/L (ref 1.6–8.3)
NEUTROPHILS NFR BLD AUTO: 67 %
NRBC # BLD AUTO: 0 10*3/UL
NRBC BLD AUTO-RTO: 0 /100
PLATELET # BLD AUTO: 230 10E9/L (ref 150–450)
POTASSIUM SERPL-SCNC: 4.1 MMOL/L (ref 3.4–5.3)
RBC # BLD AUTO: 3.35 10E12/L (ref 3.8–5.2)
SODIUM SERPL-SCNC: 139 MMOL/L (ref 133–144)
WBC # BLD AUTO: 6.1 10E9/L (ref 4–11)

## 2020-04-15 PROCEDURE — 12000000 ZZH R&B MED SURG/OB

## 2020-04-15 PROCEDURE — 25000128 H RX IP 250 OP 636: Performed by: ORTHOPAEDIC SURGERY

## 2020-04-15 PROCEDURE — 80048 BASIC METABOLIC PNL TOTAL CA: CPT | Performed by: HOSPITALIST

## 2020-04-15 PROCEDURE — 25000132 ZZH RX MED GY IP 250 OP 250 PS 637: Performed by: ORTHOPAEDIC SURGERY

## 2020-04-15 PROCEDURE — 27210209 ZZH KIT VALVED SINGLE LUMEN

## 2020-04-15 PROCEDURE — 36569 INSJ PICC 5 YR+ W/O IMAGING: CPT

## 2020-04-15 PROCEDURE — 25000125 ZZHC RX 250: Performed by: HOSPITALIST

## 2020-04-15 PROCEDURE — 36415 COLL VENOUS BLD VENIPUNCTURE: CPT | Performed by: HOSPITALIST

## 2020-04-15 PROCEDURE — 97535 SELF CARE MNGMENT TRAINING: CPT | Mod: GO | Performed by: REHABILITATION PRACTITIONER

## 2020-04-15 PROCEDURE — 99233 SBSQ HOSP IP/OBS HIGH 50: CPT | Performed by: HOSPITALIST

## 2020-04-15 PROCEDURE — 86140 C-REACTIVE PROTEIN: CPT | Performed by: HOSPITALIST

## 2020-04-15 PROCEDURE — 25800030 ZZH RX IP 258 OP 636: Performed by: ORTHOPAEDIC SURGERY

## 2020-04-15 PROCEDURE — 97161 PT EVAL LOW COMPLEX 20 MIN: CPT | Mod: GP

## 2020-04-15 PROCEDURE — 97116 GAIT TRAINING THERAPY: CPT | Mod: GP

## 2020-04-15 PROCEDURE — 85018 HEMOGLOBIN: CPT | Performed by: HOSPITALIST

## 2020-04-15 PROCEDURE — 97165 OT EVAL LOW COMPLEX 30 MIN: CPT | Mod: GO | Performed by: REHABILITATION PRACTITIONER

## 2020-04-15 PROCEDURE — 97110 THERAPEUTIC EXERCISES: CPT | Mod: GP

## 2020-04-15 PROCEDURE — 85025 COMPLETE CBC W/AUTO DIFF WBC: CPT | Performed by: HOSPITALIST

## 2020-04-15 RX ORDER — ASPIRIN 325 MG
325 TABLET, DELAYED RELEASE (ENTERIC COATED) ORAL 2 TIMES DAILY
Qty: 70 TABLET | Refills: 0 | Status: SHIPPED | OUTPATIENT
Start: 2020-04-15 | End: 2020-05-20

## 2020-04-15 RX ORDER — HYDROXYZINE HYDROCHLORIDE 25 MG/1
25 TABLET, FILM COATED ORAL EVERY 6 HOURS PRN
Qty: 40 TABLET | Refills: 0 | Status: SHIPPED | OUTPATIENT
Start: 2020-04-15

## 2020-04-15 RX ORDER — AMOXICILLIN 250 MG
2 CAPSULE ORAL 2 TIMES DAILY PRN
Qty: 20 TABLET | Refills: 0 | Status: SHIPPED | OUTPATIENT
Start: 2020-04-15

## 2020-04-15 RX ORDER — OXYCODONE AND ACETAMINOPHEN 5; 325 MG/1; MG/1
1-2 TABLET ORAL EVERY 4 HOURS PRN
Qty: 75 TABLET | Refills: 0 | Status: SHIPPED | OUTPATIENT
Start: 2020-04-15

## 2020-04-15 RX ADMIN — OXYCODONE HYDROCHLORIDE 10 MG: 5 TABLET ORAL at 12:30

## 2020-04-15 RX ADMIN — POLYETHYLENE GLYCOL 3350 17 G: 17 POWDER, FOR SOLUTION ORAL at 08:25

## 2020-04-15 RX ADMIN — ACETAMINOPHEN 975 MG: 325 TABLET, FILM COATED ORAL at 06:21

## 2020-04-15 RX ADMIN — CELECOXIB 200 MG: 200 CAPSULE ORAL at 08:09

## 2020-04-15 RX ADMIN — ENOXAPARIN SODIUM 40 MG: 40 INJECTION SUBCUTANEOUS at 08:09

## 2020-04-15 RX ADMIN — CEFTRIAXONE 2 G: 2 INJECTION, POWDER, FOR SOLUTION INTRAMUSCULAR; INTRAVENOUS at 17:35

## 2020-04-15 RX ADMIN — CELECOXIB 200 MG: 200 CAPSULE ORAL at 20:57

## 2020-04-15 RX ADMIN — OXYCODONE HYDROCHLORIDE 10 MG: 5 TABLET ORAL at 01:46

## 2020-04-15 RX ADMIN — Medication 1 LOZENGE: at 06:26

## 2020-04-15 RX ADMIN — VANCOMYCIN HYDROCHLORIDE 2000 MG: 10 INJECTION, POWDER, LYOPHILIZED, FOR SOLUTION INTRAVENOUS at 01:47

## 2020-04-15 RX ADMIN — SENNOSIDES AND DOCUSATE SODIUM 2 TABLET: 8.6; 5 TABLET ORAL at 20:57

## 2020-04-15 RX ADMIN — OXYCODONE HYDROCHLORIDE 10 MG: 5 TABLET ORAL at 06:22

## 2020-04-15 RX ADMIN — OXYCODONE HYDROCHLORIDE 10 MG: 5 TABLET ORAL at 15:58

## 2020-04-15 RX ADMIN — OXYCODONE HYDROCHLORIDE 10 MG: 5 TABLET ORAL at 21:46

## 2020-04-15 RX ADMIN — BUPROPION HYDROCHLORIDE 300 MG: 150 TABLET, EXTENDED RELEASE ORAL at 20:57

## 2020-04-15 RX ADMIN — SENNOSIDES AND DOCUSATE SODIUM 2 TABLET: 8.6; 5 TABLET ORAL at 08:09

## 2020-04-15 RX ADMIN — CYCLOBENZAPRINE HYDROCHLORIDE 10 MG: 10 TABLET, FILM COATED ORAL at 11:05

## 2020-04-15 RX ADMIN — OXYCODONE HYDROCHLORIDE 10 MG: 5 TABLET ORAL at 09:25

## 2020-04-15 RX ADMIN — Medication: at 17:55

## 2020-04-15 RX ADMIN — OXYCODONE HYDROCHLORIDE 10 MG: 5 TABLET ORAL at 18:26

## 2020-04-15 RX ADMIN — ACETAMINOPHEN 975 MG: 325 TABLET, FILM COATED ORAL at 13:58

## 2020-04-15 RX ADMIN — ACETAMINOPHEN 975 MG: 325 TABLET, FILM COATED ORAL at 21:46

## 2020-04-15 ASSESSMENT — ACTIVITIES OF DAILY LIVING (ADL)
IADL_COMMENTS: FAMILY TO COMPLETE AS NEEDED
ADLS_ACUITY_SCORE: 17
ADLS_ACUITY_SCORE: 15
ADLS_ACUITY_SCORE: 14
ADLS_ACUITY_SCORE: 13
ADLS_ACUITY_SCORE: 16
ADLS_ACUITY_SCORE: 17

## 2020-04-15 NOTE — PLAN OF CARE
Vital signs stable, on O2 at  2 lpm nasal cannula with capnography.  Lungs clear, encouraged inspirometer use.  Bowel sounds hypoactive, no nausea.Voiding.  CMS intact.Pain controlled with ice pack application, tylenol, oxycodone.  Ambulated with walker, gait belt and  assist of 1..  Remote telemetry, SR 70's per telemetry tech.  Plan of care reviewed with pt.

## 2020-04-15 NOTE — PROGRESS NOTES
"Orthopedic Surgery  4/15/2020    S: Patient voices no complaints today.     O: Blood pressure 114/65, pulse 89, temperature 97.9  F (36.6  C), temperature source Temporal, resp. rate 14, height 1.702 m (5' 7\"), weight 99.8 kg (220 lb), last menstrual period 10/13/2016, SpO2 99 %, not currently breastfeeding.  Lab Results   Component Value Date    HGB 13.0 04/13/2020     No results found for: INR     Neurovascularly intact.  Calves are negative bilaterally, both soft and nontender.  The wound is C/D/I.  The wound looks good with minimal erythema of the surrounding skin.    A: Ms. Prajapati is doing well status post Procedure(s):  Irrigation and debridement of left knee with poly exchange.    P: Continue physical therapy.   Antibiotics per ID, likely PICC line  Anticoagulation with lovenox, home on ASA  Pain management  Discharge planning    Michale Trimble MD  [phone]    "

## 2020-04-15 NOTE — PROGRESS NOTES
04/15/20 0900   Quick Adds   Type of Visit Initial PT Evaluation   Living Environment   Lives With child(tigist), adult   Living Arrangements house   Living Environment Comment Patient reports living in a house with her adult children. Pt reports that the house is a split level; ~6 stairs (one rail) to the main living level. Children are adult age and can assist around the house.    Self-Care   Usual Activity Tolerance good   Current Activity Tolerance moderate   Equipment Currently Used at Home cane, straight;crutches;walker, rolling   Activity/Exercise/Self-Care Comment Pt able to walk community distances at baseline. 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    Functional Level Prior   Ambulation 0-->independent   Transferring 0-->independent   Fall history within last six months no  (possible recent syncopal episode)   Prior Functional Level Comment Patient reports independence with mobility at baseline.    General Information   Onset of Illness/Injury or Date of Surgery - Date 04/12/20   Referring Physician Ashkan MONTAGUE   Patient/Family Goals Statement Return home; hopeful for home PT.    Pertinent History of Current Problem (include personal factors and/or comorbidities that impact the POC) 55 year old female s/p I&D and polyexchange on the L LE.    Precautions/Limitations fall precautions   Weight-Bearing Status - LLE weight-bearing as tolerated   Cognitive Status Examination   Orientation orientation to person, place and time   Level of Consciousness alert   Personal Safety and Judgment intact   Pain Assessment   Patient Currently in Pain Yes, see Vital Sign flowsheet  (5/10)   Integumentary/Edema   Integumentary/Edema Comments Dressing to the L LE clean, dry and intact.    Range of Motion (ROM)   ROM Comment Decreased L LE AROM secondary to pain, weakness. Other extemities WFLs.    Strength   Strength Comments IND SLR on the L LE.    Bed Mobility   Bed Mobility Comments Supine>sit with  "SBA   Transfer Skills   Transfer Comments Sit>stand with CGA   Gait   Gait Comments Ambulates with FWW and CGA   Balance   Balance Comments Requires bilat UE support on FWW; good dynamic balance with FWW.    Sensory Examination   Sensory Perception Comments Denies burning, numbness and tingling   Modality Interventions   Planned Modality Interventions Cryotherapy   Planned Modality Interventions Comments PRN   General Therapy Interventions   Planned Therapy Interventions bed mobility training;gait training;ROM;strengthening;transfer training;home program guidelines;progressive activity/exercise   Clinical Impression   Criteria for Skilled Therapeutic Intervention yes, treatment indicated   PT Diagnosis Impaired gait   Influenced by the following impairments Pain, decreased L LE AROM/strength, impaired balance, decreased activity tolerance   Functional limitations due to impairments Decreased IND with bed mobility, transfers, ambulation and stairs   Clinical Presentation Stable/Uncomplicated   Clinical Presentation Rationale Medically stable.    Clinical Decision Making (Complexity) Low complexity   Therapy Frequency Daily   Predicted Duration of Therapy Intervention (days/wks) 2 days   Anticipated Discharge Disposition Other (see comments)  (Defer to ortho)   Risk & Benefits of therapy have been explained Yes   Patient, Family & other staff in agreement with plan of care Yes   [facility] [employer] TM \"6 Clicks\"   2016, Trustees of [facility], under license to [employer].  All rights reserved.   6 Clicks Short Forms Basic Mobility Inpatient Short Form   [facility] AM-PAC  \"6 Clicks\" V.2 Basic Mobility Inpatient Short Form   1. Turning from your back to your side while in a flat bed without using bedrails? 4 - None   2. Moving from lying on your back to sitting on the side of a flat bed without using bedrails? 4 - None   3. Moving to and from a bed to a chair (including a wheelchair)? 3 - A Little "   4. Standing up from a chair using your arms (e.g., wheelchair, or bedside chair)? 3 - A Little   5. To walk in hospital room? 3 - A Little   6. Climbing 3-5 steps with a railing? 3 - A Little   Basic Mobility Raw Score (Score out of 24.Lower scores equate to lower levels of function) 20   Total Evaluation Time   Total Evaluation Time (Minutes) 9

## 2020-04-15 NOTE — PROGRESS NOTES
"[facility]  Infectious Disease Progress Note          Assessment and Plan:   IMPRESSION:   1.  Healthy 55-year-old female with a late, relatively acute left total knee arthroplasty infection, obvious infection and Gram stain positive, full culture pending, no major clinical sepsis.   2.  Obesity.   3.  Degenerative arthritis of bilateral knee replacements with no prior infection history.      RECOMMENDATIONS:   1.  Continue   ceftriaxone.  strep mitis alone discontinue vanco. will Follow-up on op cxs  2.  Note operative findings per Dr. Trimble. Had exchange arthroplasty,  likely 6 weeks IV followed by 3 months of intensive oral and then discussion on long-term possible suppression  3 At some point see dentist        Interval History:   no new complaints and doing well; no cp, sob, n/v/d, or abd pain.  cx strep mitis aspirate cx lip lesion ? HSV              Medications:       acetaminophen  975 mg Oral Q8H     buPROPion  300 mg Oral QPM     cefTRIAXone  2 g Intravenous Q24H     celecoxib  200 mg Oral BID     enoxaparin ANTICOAGULANT  40 mg Subcutaneous Q24H     polyethylene glycol  17 g Oral Daily     senna-docusate  2 tablet Oral BID     sodium chloride (PF)  3 mL Intracatheter Q8H     sodium chloride (PF)  3 mL Intracatheter Q8H     tranexamic acid  1 g Intravenous Once                  Physical Exam:   Blood pressure 110/72, pulse 86, temperature 99.4  F (37.4  C), temperature source Temporal, resp. rate 20, height 1.702 m (5' 7\"), weight 99.8 kg (220 lb), last menstrual period 10/13/2016, SpO2 96 %, not currently breastfeeding.  Wt Readings from Last 2 Encounters:   04/12/20 99.8 kg (220 lb)   10/28/16 81.6 kg (180 lb)     Vital Signs with Ranges  Temp:  [97.9  F (36.6  C)-99.7  F (37.6  C)] 99.4  F (37.4  C)  Pulse:  [72-89] 86  Heart Rate:  [67-89] 67  Resp:  [13-20] 20  BP: (110-140)/(45-74) 110/72  SpO2:  [95 %-99 %] 96 %    Constitutional: Awake, alert, cooperative, no apparent " distress   Lungs: Clear to auscultation bilaterally, no crackles or wheezing   Cardiovascular: Regular rate and rhythm, normal S1 and S2, and no murmur noted   Abdomen: Normal bowel sounds, soft, non-distended, non-tender   Skin: No rashes, no cyanosis, no edema   Other: Lip lesion inside lip HSV vs aphtous ulcer          Data:   All microbiology laboratory data reviewed.  Recent Labs   Lab Test 04/15/20  0619 04/14/20  1604 04/13/20  0704 04/12/20  1205   WBC 6.1  --  9.9 14.8*   HGB 10.1*  --  13.0 13.6   HCT 32.2*  --  40.8 41.7   MCV 96  --  95 93    226 221 268     Recent Labs   Lab Test 04/15/20  0619 04/14/20  1604 04/14/20  0657   CR 0.70 0.72 0.64     Recent Labs   Lab Test 04/12/20  1205   SED 18     Recent Labs   Lab Test 04/14/20  1048 04/12/20  1850 04/12/20  1356 04/12/20  1319 04/12/20  1205   CULT Culture negative monitoring continues  Culture negative monitoring continues  Culture negative monitoring continues  Culture negative monitoring continues  Culture negative monitoring continues  Culture negative monitoring continues  Culture negative monitoring continues  Culture negative monitoring continues No growth On day 1, isolated in broth only:  Streptococcus mitis group  *  Critical Value/Significant Value, preliminary result only, called to and read back by  Dianelys Lugo RN 4.13.20 1042. ARMAND   No growth after 3 days No growth after 3 days

## 2020-04-15 NOTE — PLAN OF CARE
OT- eval and treatment completed. Patient admitted with sepsis secondary to acutely infected left knee arthroplasty s/p irrigation an debridement with exchange of polyethylene. POD #1. . Patient reports living in a house with her adult children. Pt reports that the house is a split level; ~6 stairs (one rail) to the main living level. Children are adult age and can assist around the house. Pt able to walk community distances at baseline. 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        Discharge Planner OT   Patient plan for discharge: home with family A  Current status: Educated in EC/WS techniques, advancement of activity following surgery, car transfers and AE recommendations of LHS and extended tub bench patient was engaged in instruction and verbalized understanding. After instruction, patient was mod I with standard toilet transfer and tub transfer with extended tub bench. SBA for LE dressing, including standing to fully rosie over hips. Throughout session, patient was SBA, fww for functional mobility in room, satellite and hallway. Goals met  Barriers to return to prior living situation: none from OT standpoint  Recommendations for discharge: Home with A from family for IADLs (driving, housekeeping, laundry and errands), recommended extended tub bench and long handle sponge  Rationale for recommendations: patient has met needed goals to return home with family A with IADL's as needed. Goals met, will discharge from OT services.        Entered by: Genie Saavedra 04/15/2020 10:43 AM       Occupational Therapy Discharge Summary    Reason for therapy discharge:    All goals and outcomes met, no further needs identified.    Progress towards therapy goal(s). See goals on Care Plan in [facility] electronic health record for goal details.  Goals met    Therapy recommendation(s):    No further therapy is recommended.

## 2020-04-15 NOTE — PLAN OF CARE
PT: Orders received, evaluation completed and treatment initiated. 55 year old female s/p I&D and polyexchange on the L LE. IND with mobility at baseline. Patient reports living in a house with her adult children. Pt reports that the house is a split level; ~6 stairs (one rail) to the main living level. Children are adult age and can assist around the house. Pt able to walk community distances at baseline. 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     Discharge Planner PT   Patient plan for discharge: Home  Current status: Sit>stand with SBA, 2 reps performed, no cues needed for safe hand placement. Patient ambulates 110' with FWW and CGA progressing to supervision. Pt demonstrates step-to gait, decreased heel/toe and decreased speed. PT cues for progression to reciprocal gait and progression to increased knee flexion during swing. Cued for proximity to the walker. Educated on safe sequencing with the stairs. Pt performs x4 stairs with bilat rail, CGA and cues. Performs an additional 4 stairs with unilateral rail/cane, SBA and cues. Seated rest needed due to fatigue.   Barriers to return to prior living situation: None anticipated.   Recommendations for discharge: Home, FWW use, HHPT (or OP telehealth)  Rationale for recommendations: Pt moving well despite pain. Anticipate safe discharge to home when medically ready. Rec HHPT or telehealth due to the COVID pandemic.        Entered by: Nasir Russo 04/15/2020 10:32 AM

## 2020-04-15 NOTE — PROGRESS NOTES
04/15/20 0956   Quick Adds   Type of Visit Initial Occupational Therapy Evaluation   Living Environment   Lives With child(tigist), adult   Self-Care   Equipment Currently Used at Home cane, straight;walker, rolling;crutches   Activity/Exercise/Self-Care Comment Plans to purchase an extended tub bench and leg , has a reacher and slippy gater to don compression socks   Functional Level   Ambulation 0-->independent   Transferring 0-->independent   Toileting 0-->independent   Bathing 0-->independent   Dressing 1-->assistive equipment   Eating 0-->independent   Communication 0-->understands/communicates without difficulty   Swallowing 0-->swallows foods/liquids without difficulty   Cognition 0 - no cognition issues reported   Fall history within last six months no   General Information   Onset of Illness/Injury or Date of Surgery - Date 04/12/20   Referring Physician Dr. Trimble   Patient/Family Goals Statement dc home   Additional Occupational Profile Info/Pertinent History of Current Problem Patient admitted with sepsis secondary to acutely infected left knee arthroplasty s/p irrigation an debridement with exchange of polyethylene. 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   Cognitive Status Examination   Orientation orientation to person, place and time   Level of Consciousness alert   Follows Commands (Cognition) WNL   Memory intact   Visual Perception   Visual Perception No deficits were identified   Sensory Examination   Sensory Quick Adds No deficits were identified   Pain Assessment   Patient Currently in Pain Yes, see Vital Sign flowsheet  (rating 6/10)   Posture   Posture not impaired   Range of Motion (ROM)   ROM Quick Adds No deficits were identified   Strength    Manual Muscle Testing Quick Adds No deficits were identified   Hand Strength   Hand Strength Comments intact   Muscle Tone Assessment   Muscle Tone Quick Adds No deficits were identified   Coordination   Upper Extremity Coordination No deficits were identified   Mobility   Bed Mobility Comments SBA for bed mobility   Transfer Skill: Sit to Stand   Level of [place]: Sit/Stand stand-by assist   Physical Assist/Nonphysical Assist: Sit/Stand supervision   Transfer Skill: Sit to Stand weight-bearing as tolerated   Assistive Device for Transfer: Sit/Stand rolling walker   Toilet Transfer   Toilet Transfer Comments treatment initiated-defer to OT daily note for details   Tub/Shower Transfer   Tub/Shower Transfer Comments treatment initiated-defer to OT daily note for details   Balance   Balance Comments LOB was not note-defer to OT daily note for details   Lower Body Dressing   Level of [place]: Dress Lower Body   (treatment initiated-defer to OT daily note for details)   Eating/Self Feeding   Level of [place]: Eating independent   Instrumental Activities of Daily Living (IADL)   IADL Comments family to complete as needed   Activities of Daily Living Analysis   Impairments Contributing to Impaired Activities of Daily Living balance impaired;pain;post surgical precautions;ROM decreased;strength decreased   General Therapy Interventions   Planned Therapy Interventions ADL retraining;transfer training;progressive activity/exercise   Clinical Impression   Criteria for Skilled Therapeutic Interventions Met yes, treatment indicated   OT Diagnosis decreased ADL's/IADLs   Influenced by the following impairments balance impaired;pain;post surgical precautions;ROM decreased;strength decreased   Assessment of Occupational Performance 3-5 Performance Deficits   Identified Performance Deficits decreased ADL's/IADLs- dsg, toileting, bathing, household chores, work duties   Clinical Decision Making (Complexity) Low  "complexity   Predicted Duration of Therapy Intervention (days/wks) 1 time session   Anticipated Equipment Needs at Discharge   (extended tub bench, LHS)   Anticipated Discharge Disposition Home   Risks and Benefits of Treatment have been explained. Yes   Patient, Family & other staff in agreement with plan of care Yes   [facility] TM \"6 Clicks\"   2016, Trustees of [facility], under license to [employer].  All rights reserved.   6 Clicks Short Forms Daily Activity Inpatient Short Form   [facility]  \"6 Clicks\" Daily Activity Inpatient Short Form   1. Putting on and taking off regular lower body clothing? 4 - None   2. Bathing (including washing, rinsing, drying)? 3 - A Little   3. Toileting, which includes using toilet, bedpan or urinal? 4 - None   4. Putting on and taking off regular upper body clothing? 4 - None   5. Taking care of personal grooming such as brushing teeth? 4 - None   6. Eating meals? 4 - None   Daily Activity Raw Score (Score out of 24.Lower scores equate to lower levels of function) 23   Total Evaluation Time   Total Evaluation Time (Minutes) 8     "

## 2020-04-15 NOTE — PROCEDURES
[facility]    Single Lumen PICC Placement    Date/Time: 4/15/2020 2:57 PM  Performed by: Margarita Lilly RN  Authorized by: Henry Hughes MD   Indications: vascular access    UNIVERSAL PROTOCOL   Site Marked: No  Prior Images Obtained and Reviewed:  No  Required items: Required blood products, implants, devices and special equipment available    Patient identity confirmed:  Verbally with patient, arm band and hospital-assigned identification number  NA - No sedation, light sedation, or local anesthesia  Confirmation Checklist:  Patient's identity using two indicators, relevant allergies, procedure was appropriate and matched the consent or emergent situation and correct equipment/implants were available  Time out: Immediately prior to the procedure a time out was called (Angelic Landeros RN)    Universal Protocol: the Joint Commission Universal Protocol was followed    Preparation: Patient was prepped and draped in usual sterile fashion    ESBL (mL):  1         ANESTHESIA    Anesthesia: Local infiltration  Local Anesthetic:  Lidocaine 1% without epinephrine  Anesthetic Total (mL):  1.5      SEDATION    Patient Sedated: No        Preparation: skin prepped with ChloraPrep  Skin prep agent: skin prep agent completely dried prior to procedure  Sterile barriers: maximum sterile barriers were used: cap, mask, sterile gown, sterile gloves, and large sterile sheet  Hand hygiene: hand hygiene performed prior to central venous catheter insertion  Type of line used: Power PICC  Catheter type: single lumen  Lumen type: valved  Catheter size: 4 Fr  Brand: Bard  Lot number: [id number]  Placement method: venipuncture, MST, ultrasound and tip confirmation system  Number of attempts: 1  Successful placement: yes  Orientation: right  Location: basilic vein  Arm circumference: adults 10 cm  Extremity circumference: 40  Visible catheter length: 3  Internal length: 44 cm  Total catheter length: 47  Dressing and  securement: blood cleaned with CHG, dressing applied, chlorhexidine patch applied, occlusive dressing applied, sterile dressing applied and securement device  Post procedure assessment: blood return through all ports and free fluid flow  PROCEDURE   Patient Tolerance:  Patient tolerated the procedure well with no immediate complications  Describe Procedure: ecg tip confirmation,line is CAJ and good to use

## 2020-04-15 NOTE — CONSULTS
Discharge Planner   Discharge Plans in progress: home with [facility]  Barriers to discharge plan: medically active  Follow up plan: home infusion benefits check completed and reviewed with the pt. Pt is agreeable to [facility] at discharge. Waiting for final orders.       Entered by: Lizzeth Ingram 04/15/2020 9:48 AM     Will continue to follow with discharge planning.    Tiffanie Ingram RN, BSN CTS  Care Coordinator  [facility]   [phone]

## 2020-04-15 NOTE — PROGRESS NOTES
[facility]    Referral received for [facility] to provide IV ceftriaxone. I met with the patient at bedside today. Introducted myself and my role to assist with a safe transition to home. Offered some preliminary information about I services. Let patient know I am available M-F and would continue to follow and be available for any questions.     Plan to provide teaching for IV infusion tomorrow prior to discharge.     Thank you for the home infusion referral.    Margarita Vidal RN  [facility] Liaison  [phone] (M-F 8a-5p)  [phone] Office

## 2020-04-15 NOTE — PLAN OF CARE
VSS, 2 L O2, capnography, POD1, AOx4, up Ax1 walker GB to bathroom. ACE wrap CDI, ice applied, CMS intact, LS coarse, encouraged IS and fluids. IV Vanco, Rocephin, hemovac, tele. Taking Oxycodone 10mg q3 hours for pain. Nai is hoping I&D will put in a PICC today and she can be discharged shortly after.

## 2020-04-15 NOTE — PLAN OF CARE
Patient alert and oriented  Peripheral IV saline locked  Tolerating diet, no nausea  Lungs clear, denies SOB  VSS, on room air  Pain controlled with PRN oxy and scheduled tylenol, 5/10- flexeril PRN given once  Up assist x1 with walker and gait belt, WBAT  Positive BS, passing some gas  No BM, scheduled senna and miralax given  Compression wrap in place, CDI  CMS intact  Voiding without issue  Receiving IV abx- per ID  Discharge TBD, plan for IV abx than orals  PICC placed  Continue with plan of care

## 2020-04-15 NOTE — PROGRESS NOTES
[facility]    Hospitalist Progress Note    Date of Service (when I saw the patient): 04/15/2020  Provider:  Nash Lopez MD   Text Page  7am - 6PM       Assessment & Plan   Annemarie Prajapati is a 55 year old female admitted on 4/12/2020. She has a past medical history significant for migraine headaches, depression, osteoarthritis, and latent tuberculosis.  She presented to emergency room with left knee pain and swelling.  Found to have suspected infected left knee arthroplasty.     1.  Sepsis secondary to acutely infected left knee arthroplasty s/p irrigation an debridement with exchange of polyethylene. POD #1.   [place]ia fluid 4/12 growing strept mittis  Appreciate orthopedic surgery involvement and ID recommendations.   -On  IV cefazolin and vancomycin.      -Pain medications as needed.   -Incentive spirometry.     - Post surgical care per ortho ream.   - Long term plan is 4 weeks of ABX and 12 weeks of oral ABX. She might need suppressive therapy.      2.  Acute streptoccocal infection of left knee hard ware.  Continue IV vancomycin and cefazolin. Await cultures final results. Pain meds prn.      3.  Depression. Wellbutrin.     4.  Possible syncopal event. Associated to acme of knee pain, suspect vasovagal. Monitor on telemetry.  Normal echocardiogram.     5.  Sinus tachycardia.  Likely [pain driven. Monitor on telemetry. Normal TSH level.     Leo Catheter: removed after surgery  Diet: Regular diet.    DVT Prophylaxis: Pneumatic Compression Devices  Code Status: Full Code    Disposition: Expected discharge in 2 - 3 days once outpatient plan in place. Needs PICC placement.     Interval History   She is feeling fine, denies chest pain, shortness of breath.  Normal eating. No fever, no other concerns.  Some throat discomfort using Lozenge. Doing PT.     -Data reviewed today: I reviewed all new labs and imaging results over the last 24 hours. I personally reviewed the EKG tracing showing Sinus in  monitor .    Physical Exam   Temp: 98.9  F (37.2  C) Temp src: Temporal BP: 123/73 Pulse: 83 Heart Rate: 67 Resp: 19 SpO2: 95 % O2 Device: None (Room air) Oxygen Delivery: 2 LPM  Vitals:    04/12/20 1152   Weight: 99.8 kg (220 lb)     Vital Signs with Ranges  Temp:  [97.9  F (36.6  C)-99.7  F (37.6  C)] 98.9  F (37.2  C)  Pulse:  [] 83  Heart Rate:  [] 67  Resp:  [7-19] 19  BP: (111-158)/() 123/73  SpO2:  [94 %-100 %] 95 %  I/O last 3 completed shifts:  In: 3593 [P.O.:1200; I.V.:2393]  Out: 1580 [Urine:1400; Drains:80; Blood:100]    GEN:  Alert, oriented x 3, appears comfortable, NAD.  HEENT:  Normocephalic/atraumatic, no scleral icterus, no nasal discharge, mouth moist.  CV:  Regular rate and rhythm, no murmur or JVD.  S1 + S2 noted, no S3 or S4.  LUNGS:  Clear to auscultation bilaterally without rales/rhonchi/wheezing/retractions.  Symmetric chest rise on inhalation noted.  ABD:  Active bowel sounds, soft, non-tender/non-distended.  No rebound/guarding/rigidity.  EXT:  Left knee is wrapped with an surgical bandage, no active bleeding seen, Hemovac removed.  Distal sensitivity and motility of the foot preserved.. No edema or cyanosis.  No joint synovitis noted.  SKIN:  Dry to touch, no exanthems noted in the visualized areas.       Medications     lactated ringers       sodium chloride Stopped (04/15/20 0815)       acetaminophen  975 mg Oral Q8H     buPROPion  300 mg Oral QPM     cefTRIAXone  2 g Intravenous Q24H     celecoxib  200 mg Oral BID     enoxaparin ANTICOAGULANT  40 mg Subcutaneous Q24H     polyethylene glycol  17 g Oral Daily     senna-docusate  2 tablet Oral BID     sodium chloride (PF)  3 mL Intracatheter Q8H     sodium chloride (PF)  3 mL Intracatheter Q8H     tranexamic acid  1 g Intravenous Once     vancomycin (VANCOCIN) IV  2,000 mg Intravenous Q12H       Data   Recent Labs   Lab 04/15/20  0619 04/14/20  1604 04/14/20  0657 04/13/20  0704 04/12/20  1205   WBC 6.1  --   --  9.9  14.8*   HGB 10.1*  --   --  13.0 13.6   MCV 96  --   --  95 93    226  --  221 268     --   --  139 136   POTASSIUM 4.1  --   --  4.0 3.8   CHLORIDE 109  --   --  111* 106   CO2 27  --   --  25 26   BUN 7  --   --  8 11   CR 0.70 0.72 0.64 0.69 0.71   ANIONGAP 3  --   --  3 4   VINOD 8.3*  --   --  8.2* 8.7   GLC 97  --   --  108* 147*       Recent Results (from the past 24 hour(s))   XR Knee Port Left 1/2 Views    Narrative    LEFT KNEE PORTABLE ONE TO TWO VIEWS  4/14/2020 12:39 PM     HISTORY: Postoperative.    COMPARISON: 4/12/2020.      Impression    IMPRESSION: Interval placement of intra-articular antibiotic beads. A  surgical drain, skin staples and postoperative soft tissue air are  noted. No change in appearance of left knee total arthroplasty.    IDALIA ZUNIGA MD       Disclaimer: This note consists of symbols derived from keyboarding, dictation and/or voice recognition software. As a result, there may be errors in the script that have gone undetected. Please consider this when interpreting information found in this chart.

## 2020-04-16 ENCOUNTER — HOME INFUSION (PRE-WILLOW HOME INFUSION) (OUTPATIENT)
Dept: PHARMACY | Facility: CLINIC | Age: 55
End: 2020-04-16

## 2020-04-16 ENCOUNTER — APPOINTMENT (OUTPATIENT)
Dept: PHYSICAL THERAPY | Facility: CLINIC | Age: 55
DRG: 485 | End: 2020-04-16
Payer: COMMERCIAL

## 2020-04-16 VITALS
BODY MASS INDEX: 34.53 KG/M2 | HEIGHT: 67 IN | TEMPERATURE: 98.7 F | OXYGEN SATURATION: 96 % | SYSTOLIC BLOOD PRESSURE: 123 MMHG | HEART RATE: 86 BPM | WEIGHT: 220 LBS | DIASTOLIC BLOOD PRESSURE: 68 MMHG | RESPIRATION RATE: 16 BRPM

## 2020-04-16 LAB
ANION GAP SERPL CALCULATED.3IONS-SCNC: 5 MMOL/L (ref 3–14)
BASOPHILS # BLD AUTO: 0 10E9/L (ref 0–0.2)
BASOPHILS NFR BLD AUTO: 0.4 %
BUN SERPL-MCNC: 7 MG/DL (ref 7–30)
CALCIUM SERPL-MCNC: 8.9 MG/DL (ref 8.5–10.1)
CHLORIDE SERPL-SCNC: 107 MMOL/L (ref 94–109)
CO2 SERPL-SCNC: 27 MMOL/L (ref 20–32)
CREAT SERPL-MCNC: 0.67 MG/DL (ref 0.52–1.04)
CRP SERPL-MCNC: 188 MG/L (ref 0–8)
DIFFERENTIAL METHOD BLD: ABNORMAL
EOSINOPHIL # BLD AUTO: 0.2 10E9/L (ref 0–0.7)
EOSINOPHIL NFR BLD AUTO: 3.3 %
ERYTHROCYTE [DISTWIDTH] IN BLOOD BY AUTOMATED COUNT: 13.2 % (ref 10–15)
GFR SERPL CREATININE-BSD FRML MDRD: >90 ML/MIN/{1.73_M2}
GLUCOSE SERPL-MCNC: 94 MG/DL (ref 70–99)
HCT VFR BLD AUTO: 29.8 % (ref 35–47)
HGB BLD-MCNC: 9.6 G/DL (ref 11.7–15.7)
IMM GRANULOCYTES # BLD: 0 10E9/L (ref 0–0.4)
IMM GRANULOCYTES NFR BLD: 0.3 %
IRON SATN MFR SERPL: 6 % (ref 15–46)
IRON SERPL-MCNC: 11 UG/DL (ref 35–180)
LYMPHOCYTES # BLD AUTO: 1.5 10E9/L (ref 0.8–5.3)
LYMPHOCYTES NFR BLD AUTO: 21.4 %
MCH RBC QN AUTO: 29.7 PG (ref 26.5–33)
MCHC RBC AUTO-ENTMCNC: 32.2 G/DL (ref 31.5–36.5)
MCV RBC AUTO: 92 FL (ref 78–100)
MONOCYTES # BLD AUTO: 0.7 10E9/L (ref 0–1.3)
MONOCYTES NFR BLD AUTO: 9.9 %
NEUTROPHILS # BLD AUTO: 4.5 10E9/L (ref 1.6–8.3)
NEUTROPHILS NFR BLD AUTO: 64.7 %
NRBC # BLD AUTO: 0 10*3/UL
NRBC BLD AUTO-RTO: 0 /100
PLATELET # BLD AUTO: 256 10E9/L (ref 150–450)
POTASSIUM SERPL-SCNC: 4 MMOL/L (ref 3.4–5.3)
RBC # BLD AUTO: 3.23 10E12/L (ref 3.8–5.2)
SODIUM SERPL-SCNC: 139 MMOL/L (ref 133–144)
TIBC SERPL-MCNC: 172 UG/DL (ref 240–430)
WBC # BLD AUTO: 7 10E9/L (ref 4–11)

## 2020-04-16 PROCEDURE — 83540 ASSAY OF IRON: CPT | Performed by: HOSPITALIST

## 2020-04-16 PROCEDURE — 97110 THERAPEUTIC EXERCISES: CPT | Mod: GP | Performed by: PHYSICAL THERAPIST

## 2020-04-16 PROCEDURE — 85025 COMPLETE CBC W/AUTO DIFF WBC: CPT | Performed by: HOSPITALIST

## 2020-04-16 PROCEDURE — 86140 C-REACTIVE PROTEIN: CPT | Performed by: HOSPITALIST

## 2020-04-16 PROCEDURE — 80048 BASIC METABOLIC PNL TOTAL CA: CPT | Performed by: HOSPITALIST

## 2020-04-16 PROCEDURE — 99207 ZZC CDG-CODE CATEGORY CHANGED: CPT | Performed by: HOSPITALIST

## 2020-04-16 PROCEDURE — 25000128 H RX IP 250 OP 636: Performed by: INTERNAL MEDICINE

## 2020-04-16 PROCEDURE — 83550 IRON BINDING TEST: CPT | Performed by: HOSPITALIST

## 2020-04-16 PROCEDURE — 25000128 H RX IP 250 OP 636: Performed by: ORTHOPAEDIC SURGERY

## 2020-04-16 PROCEDURE — 40000257 ZZH STATISTIC CONSULT NO CHARGE VASC ACCESS

## 2020-04-16 PROCEDURE — 97116 GAIT TRAINING THERAPY: CPT | Mod: GP | Performed by: PHYSICAL THERAPIST

## 2020-04-16 PROCEDURE — 99231 SBSQ HOSP IP/OBS SF/LOW 25: CPT | Performed by: HOSPITALIST

## 2020-04-16 PROCEDURE — 25000132 ZZH RX MED GY IP 250 OP 250 PS 637: Performed by: ORTHOPAEDIC SURGERY

## 2020-04-16 RX ORDER — CEFTRIAXONE 1 G/1
2000 INJECTION, POWDER, FOR SOLUTION INTRAMUSCULAR; INTRAVENOUS DAILY
Qty: 600 ML | Refills: 0 | Status: SHIPPED | OUTPATIENT
Start: 2020-04-16 | End: 2020-05-26

## 2020-04-16 RX ORDER — CEFTRIAXONE 2 G/1
2 INJECTION, POWDER, FOR SOLUTION INTRAMUSCULAR; INTRAVENOUS ONCE
Status: COMPLETED | OUTPATIENT
Start: 2020-04-16 | End: 2020-04-16

## 2020-04-16 RX ADMIN — CELECOXIB 200 MG: 200 CAPSULE ORAL at 08:48

## 2020-04-16 RX ADMIN — OXYCODONE HYDROCHLORIDE 5 MG: 5 TABLET ORAL at 08:48

## 2020-04-16 RX ADMIN — ACETAMINOPHEN 975 MG: 325 TABLET, FILM COATED ORAL at 13:05

## 2020-04-16 RX ADMIN — OXYCODONE HYDROCHLORIDE 10 MG: 5 TABLET ORAL at 05:15

## 2020-04-16 RX ADMIN — POLYETHYLENE GLYCOL 3350 17 G: 17 POWDER, FOR SOLUTION ORAL at 08:49

## 2020-04-16 RX ADMIN — CEFTRIAXONE 2 G: 2 INJECTION, POWDER, FOR SOLUTION INTRAMUSCULAR; INTRAVENOUS at 13:13

## 2020-04-16 RX ADMIN — OXYCODONE HYDROCHLORIDE 10 MG: 5 TABLET ORAL at 00:49

## 2020-04-16 RX ADMIN — SENNOSIDES AND DOCUSATE SODIUM 2 TABLET: 8.6; 5 TABLET ORAL at 08:48

## 2020-04-16 RX ADMIN — ACETAMINOPHEN 975 MG: 325 TABLET, FILM COATED ORAL at 05:15

## 2020-04-16 RX ADMIN — ENOXAPARIN SODIUM 40 MG: 40 INJECTION SUBCUTANEOUS at 08:49

## 2020-04-16 RX ADMIN — OXYCODONE HYDROCHLORIDE 10 MG: 5 TABLET ORAL at 12:21

## 2020-04-16 ASSESSMENT — ACTIVITIES OF DAILY LIVING (ADL)
ADLS_ACUITY_SCORE: 15
ADLS_ACUITY_SCORE: 15
ADLS_ACUITY_SCORE: 13
ADLS_ACUITY_SCORE: 15

## 2020-04-16 NOTE — PROGRESS NOTES
Home Infusion  Annemarie will be discharging today and going home on IV rocephin.  She has never done home IV therapy before but did incredibly well with IV administration teaching.      Met with Annemarie at bedside.  Provided information on [facility] services and instructed in Rocephin administration via IVP with SAS flushing.   Had her perform hands on with practice equipment and teaching sheets.    Provided information about supplies and supply delivery, storage of medication, checking of label, dosing times, plan for SNV and 24/7 availability of [facility] staff.    [facility] will see pt for RN and PT.    Annemarie verbalized understanding of information given.  She demonstrated very good technique with practice and verbalized good understanding of process.  Stated she feels comfortable with administering her abx later tonight.  Annemarie is ready for discharge from [facility] perspective.    Margarita Vidal RN  [facility] Liaison  [phone] (M-F 8a-5p)  [phone] Office

## 2020-04-16 NOTE — PLAN OF CARE
Vital signs stable.  Lungs clear, encouraged inspirometer use.  Ambulated using walker, gait belt, sba of 1.  Pain controlled with tylenol, ice pack application, oxycodone.  Dressing intact, dry.Picc line patent, gauze  beneath dressing has some bloody drainage, pt refused to have picc line dressing changed tonight, wanted to wait until in the morning to have same done.Aqua k pad was applied earlier this evening to right upper arm for 40 mins post picc line placement.Patient will call when needed monitor.On iv Rocephin.  HGB 10.1.  Care plan reviewed with pt.

## 2020-04-16 NOTE — DISCHARGE SUMMARY
[facility]    Discharge Summary  Hospitalist    Date of Admission:  4/12/2020  Date of Discharge:  4/16/2020  Provider:  Nash Lopez MD  Date of Service (when I last saw the patient): 04/16/20    Discharge Diagnoses   1.  Sepsis secondary to acutely infected left knee arthroplasty s/p irrigation an debridement with exchange of polyethylene. POD #2.  2.  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   .   3.  Depression.    4.  Possible syncopal event, suspect vasovagal.    5.  Sinus tachycardia in the setting of sepsis. .     Other medical issues:  Past Medical History:   Diagnosis Date     Depression      History of mononucleosis      Knee pain, bilateral      Migraine        History of Present Illness   Annemarie Prajapati is an 55 year old female who presented with knee pain, infection.  Please see the admission history and physical for full details.    Hospital Course   Annemarie Prajapati is a 55 year old female admitted on 4/12/2020. She has a past medical history significant for migraine headaches, depression, osteoarthritis, and latent tuberculosis.  She presented to emergency room with left knee pain and swelling.  Found to have suspected infected left knee arthroplasty.     1.  Sepsis secondary to acutely infected left knee arthroplasty s/p irrigation an debridement with exchange of polyethylene. POD #2.   [place] fluid 4/12 growing strept mittis  Appreciate orthopedic surgery involvement and ID recommendations.   -On  IV cefazolin and vancomycin. Rocephin after discharge     -Pain medications as needed.   -Incentive spirometry.     - Post surgical care per ortho ream.   - Long term plan is 6 weeks of IV ABX and 12 weeks of oral ABX. She might need suppressive therapy, per infectious disease plan.      2.  Acute streptoccocal infection of left knee hard ware.  Continue IV vancomycin and cefazolin. Await cultures final results. Pain meds prn.      3.  Depression.  Wellbutrin.     4.  Possible syncopal event. Associated to acme of knee pain, suspect vasovagal. Monitor on telemetry.  Normal echocardiogram.     5.  Sinus tachycardia.  Likely [pain driven. Monitor on telemetry. Normal TSH level.    # Discharge Pain Plan:    - Patient currently has NO PAIN and is not being prescribed pain medications on discharge.      Significant Results and Procedures   See below     Pending Results      Unresulted Labs Ordered in the Past 30 Days of this Admission     Date and Time Order Name Status Description    4/14/2020 1052 Tissue Culture Aerobic Bacterial Preliminary     4/14/2020 1052 Tissue Culture Aerobic Bacterial Preliminary     4/14/2020 1052 Fluid Culture Aerobic Bacterial Preliminary     4/14/2020 1052 Anaerobic bacterial culture Preliminary     4/14/2020 1052 Anaerobic bacterial culture Preliminary     4/14/2020 1052 Anaerobic bacterial culture Preliminary     4/14/2020 1052 Anaerobic bacterial culture Preliminary     4/14/2020 1048 Tissue Culture Aerobic Bacterial Preliminary     4/12/2020 1259 Blood culture Preliminary     4/12/2020 1231 Blood culture Preliminary           Code Status   Full Code       Primary Care Physician   [facility]    GEN:  Alert, oriented x 3, appears comfortable, NAD.  HEENT:  Normocephalic/atraumatic, no scleral icterus, no nasal discharge, mouth moist.  CV:  Regular rate and rhythm, no murmur or JVD.  S1 + S2 noted, no S3 or S4.  LUNGS:  Clear to auscultation bilaterally without rales/rhonchi/wheezing/retractions.  Symmetric chest rise on inhalation noted.     Discharge Disposition   Discharged to home with [facility]    Consultations This Hospital Stay   PHARMACY TO DOSE VANCO  PHARMACY TO DOSE VANCO  ORTHOPEDIC SURGERY IP CONSULT  INFECTIOUS DISEASES IP CONSULT  HOSPITALIST IP CONSULT  OCCUPATIONAL THERAPY ADULT IP CONSULT  PHYSICAL THERAPY ADULT IP CONSULT  PHARMACY TO DOSE VANCO  CARE COORDINATOR IP CONSULT  VASCULAR ACCESS ADULT IP CONSULT  VASCULAR  ACCESS ADULT IP CONSULT    Time Spent on this Encounter   I, Nash Lopez MD, personally saw the patient today and spent greater than 30 minutes discharging this patient.     Discharge Orders      Home care nursing referral      Reason for your hospital stay    Infection of left total knee arthroplasty     Follow-up and recommended labs and tests     Follow up with Dr. Trimble within 10-16 days.  No follow up labs or test are needed. Call [phone] with questions.     Activity    Your activity upon discharge: activity as tolerated     Wound care and dressings    Instructions to care for your wound at home: Leave aquacel dressing in place until post-op follow-up.  If it becomes loose or soiled then remove and replace with daily dry dressings     MD face to face encounter    Documentation of Face to Face and Certification for Home Health Services    I certify that patient: Annemarie Prajapati is under my care and that I, or a nurse practitioner or physician's assistant working with me, had a face-to-face encounter that meets the physician face-to-face encounter requirements with this patient on: 4/15/2020.    This encounter with the patient was in whole, or in part, for the following medical condition, which is the primary reason for home health care: assist with IV antibiotics.    I certify that, based on my findings, the following services are medically necessary home health services: Nursing.    My clinical findings support the need for the above services because: Nurse is needed: To provide assessment and oversight required in the home to assure adherence to the medical plan due to: IV antibiotics..    Further, I certify that my clinical findings support that this patient is homebound (i.e. absences from home require considerable and taxing effort and are for medical reasons or [employer] or infrequently or of short duration when for other reasons) because: .    Based on the above findings. I certify  that this patient is confined to the home and needs intermittent skilled nursing care, physical therapy and/or speech therapy.  The patient is under my care, and I have initiated the establishment of the plan of care.  This patient will be followed by a physician who will periodically review the plan of care.  Physician/Provider to provide follow up care: Trudi, [facility] physician (the Medicare certified DOMINIK provider): Michael Trimble MD  Physician Signature: See electronic signature associated with these discharge orders.  Date: 4/15/2020     Diet    Follow this diet upon discharge: Orders Placed This Encounter      Advance Diet as Tolerated: Regular Diet Adult     Discharge Medications   Current Discharge Medication List      START taking these medications    Details   aspirin (ASA) 325 MG EC tablet Take 1 tablet (325 mg) by mouth 2 times daily  Qty: 70 tablet, Refills: 0    Associated Diagnoses: Streptococcal arthritis of left knee (H)      cefTRIAXone (ROCEPHIN) 1 GM vial Inject 2 g (2,000 mg) into the vein daily ESR,CRP,CBC with differential, creatinine, SGOT weekly while on this medication to be faxed to Dr. Hughes office.  Qty: 600 mL, Refills: 0    Associated Diagnoses: Streptococcal arthritis of left knee (H)      hydrOXYzine (ATARAX) 25 MG tablet Take 1 tablet (25 mg) by mouth every 6 hours as needed for other (adjuvant pain)  Qty: 40 tablet, Refills: 0    Associated Diagnoses: Streptococcal arthritis of left knee (H)      oxyCODONE-acetaminophen (PERCOCET) 5-325 MG tablet Take 1-2 tablets by mouth every 4 hours as needed for pain  Qty: 75 tablet, Refills: 0    Associated Diagnoses: Streptococcal arthritis of left knee (H)      senna-docusate (SENOKOT-S/PERICOLACE) 8.6-50 MG tablet Take 2 tablets by mouth 2 times daily as needed for constipation  Qty: 20 tablet, Refills: 0    Associated Diagnoses: Streptococcal arthritis of left knee (H)         CONTINUE these medications  which have NOT CHANGED    Details   acetaminophen 650 MG TABS Take 325-650 mg by mouth every 6 hours as needed for pain.  Qty: 100 tablet, Refills: 0    Associated Diagnoses: S/P knee replacement      buPROPion (WELLBUTRIN XL) 300 MG 24 hr tablet Take 300 mg by mouth every evening      cyclobenzaprine (FLEXERIL) 10 MG tablet Take 10 mg by mouth 3 times daily as needed for muscle spasms      traZODone (DESYREL) 50 MG tablet Take 50 mg by mouth nightly as needed for sleep         STOP taking these medications       naproxen (NAPROSYN) 250 MG tablet Comments:   Reason for Stopping:             Allergies   No Known Allergies  Data   Most Recent 3 CBC's:  Recent Labs   Lab Test 04/16/20  0530 04/15/20  0619 04/14/20  1604 04/13/20  0704   WBC 7.0 6.1  --  9.9   HGB 9.6* 10.1*  --  13.0   MCV 92 96  --  95    230 226 221      Most Recent 3 BMP's:  Recent Labs   Lab Test 04/16/20  0530 04/15/20  0619 04/14/20  1604  04/13/20  0704    139  --   --  139   POTASSIUM 4.0 4.1  --   --  4.0   CHLORIDE 107 109  --   --  111*   CO2 27 27  --   --  25   BUN 7 7  --   --  8   CR 0.67 0.70 0.72   < > 0.69   ANIONGAP 5 3  --   --  3   VINOD 8.9 8.3*  --   --  8.2*   GLC 94 97  --   --  108*    < > = values in this interval not displayed.     Most Recent 2 LFT's:  Recent Labs   Lab Test 10/21/16  1950   AST 16   ALT 19   ALKPHOS 64   BILITOTAL 0.9     Most Recent INR's and Anticoagulation Dosing History:  Anticoagulation Dose History     There is no flowsheet data to display.        Most Recent 3 Troponin's:No lab results found.  Most Recent Cholesterol Panel:No lab results found.  Most Recent 6 Bacteria Isolates From Any Culture (See EPIC Reports for Culture Details):  Recent Labs   Lab Test 04/14/20  1048 04/12/20  1850 04/12/20  1356 04/12/20  1319 04/12/20  1205   CULT Light growth  Gram positive cocci  *  Culture in progress  Light growth  Gram positive cocci  *  Culture in progress  Culture negative monitoring  continues  Culture in progress  Culture negative monitoring continues  Culture negative monitoring continues  Culture negative monitoring continues  Culture negative monitoring continues No growth On day 1, isolated in broth only:  Streptococcus mitis group  *  Critical Value/Significant Value, preliminary result only, called to and read back by  Dianelys Lugo RN 4.13.20 1042. ARMAND   No growth after 4 days No growth after 4 days     Most Recent TSH, T4 and A1c Labs:  Recent Labs   Lab Test 04/12/20  1205 01/07/13  0620   TSH 1.12  --    A1C  --  5.0     Results for orders placed or performed during the hospital encounter of 04/12/20   XR Knee Left 3 Views    Narrative    LEFT KNEE THREE VIEWS  4/12/2020 12:42 PM    HISTORY: Knee pain and swelling.    COMPARISON: 1/7/2013      Impression    IMPRESSION:  Left total knee arthroplasty with patellar resurfacing  projects in expected position, unchanged. No acute displaced  periprosthetic fracture. Small to moderate-sized knee joint effusion.  Small amount of lucency is seen about the patellar resurfacing  component, which could be seen with loosening. Small to moderate-sized  knee joint effusion. Mild anterior knee soft tissue swelling. Midline  skin staples and surgical drain removed. Resolution of soft tissue and  intra-articular gas.    ABELARDO CALVO MD   US Lower Extremity Venous Duplex Left    Narrative    ULTRASOUND LOWER EXTREMITY VENOUS DUPLEX LEFT 4/12/2020 12:39 PM    CLINICAL HISTORY: Swelling, pain.    TECHNIQUE: Venous Duplex ultrasound of the left lower extremity with  and without compression, augmentation and duplex. Color flow and  spectral Doppler with waveform analysis performed.    COMPARISON: None.    FINDINGS: Exam includes the common femoral, femoral, popliteal, and  contralateral common femoral veins as well as segmentally visualized  deep calf veins and greater saphenous vein.     LEFT: No deep vein thrombosis. No superficial  thrombophlebitis. 4.8 x  2.3 x 1.1 cm popliteal fossa cyst.      Impression    IMPRESSION:  1.  No deep venous thrombosis in the left lower extremity.  2.  Baker cyst identified on the left.    MIKI ESTRADA MD   XR Chest Port 1 View    Narrative    EXAM: XR CHEST PORT 1 VW  LOCATION: [facility]  DATE/TIME: 2020 8:04 PM    INDICATION: Fever  COMPARISON: 2017      Impression    IMPRESSION: Negative chest.   XR Knee Port Left 1/2 Views    Narrative    LEFT KNEE PORTABLE ONE TO TWO VIEWS  2020 12:39 PM     HISTORY: Postoperative.    COMPARISON: 2020.      Impression    IMPRESSION: Interval placement of intra-articular antibiotic beads. A  surgical drain, skin staples and postoperative soft tissue air are  noted. No change in appearance of left knee total arthroplasty.    IDALIA ZUNIGA MD   Echocardiogram Complete    Narrative    [id number]  [id number]  [id number]  [id number]^ANTON^LUDMILA^D           [facility]  Echocardiography Laboratory  [address] [place] [address]        Name: COCO ZUÑIGA  MRN: [id number]  : 1965  Study Date: 2020 08:04 AM  Age: 55 yrs  Gender: Female  Patient Location: [facility]  Reason For Study: Syncope  Ordering Physician: LUDMILA WALTON  Referring Physician: [facility]  Performed By: Arnold Morgan RDCS     BSA: 2.1 m2  Height: 67 in  Weight: 220 lb  HR: 94  BP: 94/58 mmHg  _____________________________________________________________________________  __        Procedure  Complete Portable Echo Adult.  _____________________________________________________________________________  __        Interpretation Summary     Normal transthoracic echocardiogram. There is no comparison study available.  _____________________________________________________________________________  __        Left Ventricle  The left ventricle is normal in size. There is normal left ventricular wall  thickness. The visual ejection fraction is  estimated at 55-60%. Left  ventricular diastolic function is normal. No regional wall motion  abnormalities noted.     Right Ventricle  The right ventricle is normal in structure, function and size.     Atria  Normal left atrial size. Right atrial size is normal. There is no color  Doppler evidence of an atrial shunt.     Mitral Valve  The mitral valve is normal in structure and function.        Tricuspid Valve  The tricuspid valve is normal in structure and function. Right ventricle  systolic pressure estimate normal. There is trace to mild tricuspid  regurgitation.     Aortic Valve  The aortic valve is normal in structure and function. There is trace aortic  regurgitation.     Pulmonic Valve  Normal pulmonic valve.     Vessels  Normal size aorta. Normal size ascending aorta. The inferior vena cava is  normal.     Pericardium  The pericardium appears normal.        Rhythm  Sinus rhythm was noted.  _____________________________________________________________________________  __  MMode/2D Measurements & Calculations  IVSd: 0.88 cm     LVIDd: 4.9 cm  LVIDs: 2.9 cm  LVPWd: 0.86 cm  FS: 41.0 %  LV mass(C)d: 146.3 grams  LV mass(C)dI: 69.5 grams/m2  Ao root diam: 3.1 cm  LA dimension: 3.6 cm  asc Aorta Diam: 3.3 cm  LA/Ao: 1.2  LVOT diam: 2.1 cm  LVOT area: 3.6 cm2  LA Volume (BP): 46.8 ml  LA Volume Index (BP): 22.2 ml/m2  RWT: 0.35           Doppler Measurements & Calculations  MV E max rina: 75.7 cm/sec  MV A max rina: 85.9 cm/sec  MV E/A: 0.88  MV max P.6 mmHg  MV mean P.6 mmHg  MV V2 VTI: 19.1 cm  MVA(VTI): 4.0 cm2  MV dec slope: 457.2 cm/sec2  MV dec time: 0.17 sec  AI P1/2t: 433.3 msec  LV V1 max P.5 mmHg  LV V1 max: 127.6 cm/sec  LV V1 VTI: 21.3 cm  CO(LVOT): 6.4 l/min  CI(LVOT): 3.0 l/min/m2  SV(LVOT): 76.1 ml  SI(LVOT): 36.1 ml/m2  PA acc time: 0.10 sec  TR max rina: 262.9 cm/sec  TR max P.7 mmHg  E/E' av.6  Lateral E/e': 5.7  Medial E/e': 7.4               _____________________________________________________________________________  __        Report approved by: Leonid Johnson 04/13/2020 10:14 AM        Disclaimer: This note consists of symbols derived from keyboarding, dictation and/or voice recognition software. As a result, there may be errors in the script that have gone undetected. Please consider this when interpreting information found in this chart.

## 2020-04-16 NOTE — PROGRESS NOTES
"[facility]  Infectious Disease Progress Note          Assessment and Plan:   IMPRESSION:   1.  Healthy 55-year-old female with a late, relatively acute left total knee arthroplasty infection, obvious infection and Gram stain positive, full culture pending, no major clinical sepsis.   2.  Obesity.   3.  Degenerative arthritis of bilateral knee replacements with no prior infection history.      RECOMMENDATIONS:   1.  Continue   ceftriaxone.  strep mitis alone discontinue vanco. will Follow-up on op cxs, also + prelim likely same  2.  Note operative findings per Dr. Trimble. Had exchange arthroplasty,  likely 6 weeks IV followed by 3 months of intensive oral and then discussion on long-term possible suppression  3 At some point see dentist  4 Orders in , dose now and home        Interval History:   no new complaints and doing well; no cp, sob, n/v/d, or abd pain.  cx strep mitis aspirate cx lip lesion ? HSV              Medications:       acetaminophen  975 mg Oral Q8H     buPROPion  300 mg Oral QPM     cefTRIAXone  2 g Intravenous Once     cefTRIAXone  2 g Intravenous Q24H     enoxaparin ANTICOAGULANT  40 mg Subcutaneous Q24H     polyethylene glycol  17 g Oral Daily     senna-docusate  2 tablet Oral BID     sodium chloride (PF)  10 mL Intracatheter Q7 Days     tranexamic acid  1 g Intravenous Once                  Physical Exam:   Blood pressure 127/72, pulse 90, temperature 99.9  F (37.7  C), temperature source Temporal, resp. rate 16, height 1.702 m (5' 7\"), weight 99.8 kg (220 lb), last menstrual period 10/13/2016, SpO2 97 %, not currently breastfeeding.  Wt Readings from Last 2 Encounters:   04/12/20 99.8 kg (220 lb)   10/28/16 81.6 kg (180 lb)     Vital Signs with Ranges  Temp:  [98.9  F (37.2  C)-99.9  F (37.7  C)] 99.9  F (37.7  C)  Pulse:  [90-91] 90  Heart Rate:  [92-95] 92  Resp:  [14-18] 16  BP: (121-143)/(63-78) 127/72  SpO2:  [94 %-98 %] 97 %    Constitutional: Awake, alert, " cooperative, no apparent distress   Lungs: Clear to auscultation bilaterally, no crackles or wheezing   Cardiovascular: Regular rate and rhythm, normal S1 and S2, and no murmur noted   Abdomen: Normal bowel sounds, soft, non-distended, non-tender   Skin: No rashes, no cyanosis, no edema   Other: Lip lesion inside lip HSV vs aphtous ulcer          Data:   All microbiology laboratory data reviewed.  Recent Labs   Lab Test 04/16/20  0530 04/15/20  0619 04/14/20  1604 04/13/20  0704   WBC 7.0 6.1  --  9.9   HGB 9.6* 10.1*  --  13.0   HCT 29.8* 32.2*  --  40.8   MCV 92 96  --  95    230 226 221     Recent Labs   Lab Test 04/16/20  0530 04/15/20  0619 04/14/20  1604   CR 0.67 0.70 0.72     Recent Labs   Lab Test 04/12/20  1205   SED 18     Recent Labs   Lab Test 04/14/20  1048 04/12/20  1850 04/12/20  1356 04/12/20  1319 04/12/20  1205   CULT Light growth  Gram positive cocci  *  Culture in progress  Light growth  Gram positive cocci  *  Culture in progress  Culture negative monitoring continues  Culture in progress  Culture negative monitoring continues  Culture negative monitoring continues  Culture negative monitoring continues  Culture negative monitoring continues No growth On day 1, isolated in broth only:  Streptococcus mitis group  *  Critical Value/Significant Value, preliminary result only, called to and read back by  Dianelys Lugo RN 4.13.20 1042. ARMAND   No growth after 4 days No growth after 4 days

## 2020-04-16 NOTE — DISCHARGE SUMMARY
Patient alert and oriented  PICC in place, saline locked  IV rocephin dose given prior to discharge  Pain controlled with oral oxy, oxy/tylenol (percocet) filled for discharge  Discharge medications given to patient, printed education handouts given to patient- IV rocephin to be delivered by home infusion  VSS, on room air  Discharge instructions discussed with patient, all questions answered  Patient left floor with all belongings via wheelchair  Daughter transporting home

## 2020-04-16 NOTE — PROGRESS NOTES
"Orthopedic Surgery  4/16/2020  POD#: 2    S: Patient voices no complaints today. Denies calf pain, dizziness, and SOB.    O: Blood pressure 127/72, pulse 90, temperature 99.9  F (37.7  C), temperature source Temporal, resp. rate 16, height 1.702 m (5' 7\"), weight 99.8 kg (220 lb), last menstrual period 10/13/2016, SpO2 97 %, not currently breastfeeding.  Lab Results   Component Value Date    HGB 9.6 04/16/2020     No results found for: INR     I/O last 3 completed shifts:  In: 1940 [P.O.:1940]  Out: -     Neurovascularly intact.  Calves are negative bilaterally, both soft and nontender.  The wound is C/D/I. Aquacel with minimal drainage.  The wound looks good with minimal erythema of the surrounding skin.    A: Ms. Prajapati is doing well status post Procedure(s):  Left knee irrigation and debridement with exchange of polyethylene.    P:   1. Mobilize and continue physical therapy.  ROM as tolerated, to start OPPT after discharge.  2. Septic TKA - s/p I&D with polyethylene exchange and abx bead placement, IV abx per ID team.  3. Anticoagulation - discharge on ASA  4. Pain management - controlled  5. Anticipate discharge to home today with home infusion.      Joann Bernal PA-C  O: [phone]  "

## 2020-04-16 NOTE — PLAN OF CARE
Patient alert and oriented  PICC in place, saline locked  VSS, on room air  Pain controlled with oral oxycodone, 5/10  Tolerating diet, denies nausea  Lungs clear, no SOB  Positive BS, passing gas  No BM since before surgery  +1 edema lower left extremity  Compression wrap in place, CDI  CMS intact  Voiding without issue  Receiving IV rocephin  Possible discharge later today with IV abx then oral  Continue with plan of care

## 2020-04-16 NOTE — PLAN OF CARE
Discharge Planner PT   Patient plan for discharge: Home  Current status: Instructed patient to ambulate x120 ft and 160 ft with FWW and SBA. Patient demo'd good heel toe patterning and step through reciprocal patterning. Pt with increased UE support and slightly flexed posturing, cuing to modify which patient able to perform. Instructed patient to perform 3x 4 steps with R railing ascending and CGA using step to patterning. Patient able to recall technique for perform. No LOB or instability noted. Seated rest break following.   Instructed patient to perform the following exercises with cuing for technique and pacing to increase muscle activation for increased control with functional movements. Pt perform x5 SLR and x10 LAQ, heels slides, AP. Instructed patient to increase hold for increased eccentric quad control.   Barriers to return to prior living situation: None anticipated.   Recommendations for discharge: Home, FWW use, HHPT (or OP telehealth)  Rationale for recommendations: Pt moving well despite pain. Anticipate safe discharge to home when medically ready. Rec HHPT or telehealth due to the COVID pandemic       Entered by: Bladimir Garces 04/16/2020 9:20 AM         Physical Therapy Discharge Summary    Reason for therapy discharge:    Discharged to home with home therapy.    Progress towards therapy goal(s). See goals on Care Plan in [facility] electronic health record for goal details.  Goals partially met.  Barriers to achieving goals:   discharge from facility.    Therapy recommendation(s):    Continued therapy is recommended.  Rationale/Recommendations:  HHPT.

## 2020-04-17 LAB
BACTERIA SPEC CULT: ABNORMAL
Lab: ABNORMAL
Lab: ABNORMAL
SPECIMEN SOURCE: ABNORMAL

## 2020-04-17 NOTE — PROGRESS NOTES
This is a recent snapshot of the patient's [facility] medical record.  For current drug dose and complete information and questions, call [phone] or In Basket pool, fv home infusion ([id number])  CSN Number:  [id number]

## 2020-04-18 LAB
BACTERIA SPEC CULT: NO GROWTH
BACTERIA SPEC CULT: NO GROWTH
SPECIMEN SOURCE: NORMAL
SPECIMEN SOURCE: NORMAL

## 2020-04-20 LAB
BACTERIA SPEC CULT: ABNORMAL
Lab: ABNORMAL
Lab: ABNORMAL
SPECIMEN SOURCE: ABNORMAL
SPECIMEN SOURCE: ABNORMAL

## 2020-04-21 ENCOUNTER — MEDICAL CORRESPONDENCE (OUTPATIENT)
Dept: HEALTH INFORMATION MANAGEMENT | Facility: CLINIC | Age: 55
End: 2020-04-21

## 2020-04-21 ENCOUNTER — HOME INFUSION (PRE-WILLOW HOME INFUSION) (OUTPATIENT)
Dept: PHARMACY | Facility: CLINIC | Age: 55
End: 2020-04-21

## 2020-04-21 LAB
AST SERPL W P-5'-P-CCNC: 68 U/L (ref 0–45)
BACTERIA SPEC CULT: NORMAL
BASOPHILS # BLD AUTO: 0.1 10E9/L (ref 0–0.2)
BASOPHILS NFR BLD AUTO: 1.1 %
BUN SERPL-MCNC: 11 MG/DL (ref 7–30)
CREAT SERPL-MCNC: 0.76 MG/DL (ref 0.52–1.04)
CRP SERPL-MCNC: 77.1 MG/L (ref 0–8)
DIFFERENTIAL METHOD BLD: ABNORMAL
EOSINOPHIL # BLD AUTO: 0.4 10E9/L (ref 0–0.7)
EOSINOPHIL NFR BLD AUTO: 4.6 %
ERYTHROCYTE [DISTWIDTH] IN BLOOD BY AUTOMATED COUNT: 13.2 % (ref 10–15)
ERYTHROCYTE [SEDIMENTATION RATE] IN BLOOD BY WESTERGREN METHOD: 82 MM/H (ref 0–30)
GFR SERPL CREATININE-BSD FRML MDRD: 89 ML/MIN/{1.73_M2}
HCT VFR BLD AUTO: 33.3 % (ref 35–47)
HGB BLD-MCNC: 10.9 G/DL (ref 11.7–15.7)
IMM GRANULOCYTES # BLD: 0 10E9/L (ref 0–0.4)
IMM GRANULOCYTES NFR BLD: 0.5 %
LYMPHOCYTES # BLD AUTO: 1.9 10E9/L (ref 0.8–5.3)
LYMPHOCYTES NFR BLD AUTO: 23.7 %
Lab: NORMAL
MCH RBC QN AUTO: 29.5 PG (ref 26.5–33)
MCHC RBC AUTO-ENTMCNC: 32.7 G/DL (ref 31.5–36.5)
MCV RBC AUTO: 90 FL (ref 78–100)
MONOCYTES # BLD AUTO: 0.5 10E9/L (ref 0–1.3)
MONOCYTES NFR BLD AUTO: 5.9 %
NEUTROPHILS # BLD AUTO: 5.2 10E9/L (ref 1.6–8.3)
NEUTROPHILS NFR BLD AUTO: 64.2 %
NRBC # BLD AUTO: 0 10*3/UL
NRBC BLD AUTO-RTO: 0 /100
PLATELET # BLD AUTO: 645 10E9/L (ref 150–450)
RBC # BLD AUTO: 3.69 10E12/L (ref 3.8–5.2)
SPECIMEN SOURCE: NORMAL
WBC # BLD AUTO: 8.1 10E9/L (ref 4–11)

## 2020-04-21 PROCEDURE — 84450 TRANSFERASE (AST) (SGOT): CPT | Performed by: INTERNAL MEDICINE

## 2020-04-21 PROCEDURE — 85025 COMPLETE CBC W/AUTO DIFF WBC: CPT | Performed by: INTERNAL MEDICINE

## 2020-04-21 PROCEDURE — 85652 RBC SED RATE AUTOMATED: CPT | Performed by: INTERNAL MEDICINE

## 2020-04-21 PROCEDURE — 84520 ASSAY OF UREA NITROGEN: CPT | Performed by: INTERNAL MEDICINE

## 2020-04-21 PROCEDURE — 82565 ASSAY OF CREATININE: CPT | Performed by: INTERNAL MEDICINE

## 2020-04-21 PROCEDURE — 86140 C-REACTIVE PROTEIN: CPT | Performed by: INTERNAL MEDICINE

## 2020-04-22 NOTE — PROGRESS NOTES
This is a recent snapshot of the patient's [facility] medical record.  For current drug dose and complete information and questions, call [phone] or In Basket pool, fv home infusion ([id number])  CSN Number:  [id number]

## 2020-04-28 ENCOUNTER — HOSPITAL ENCOUNTER (EMERGENCY)
Facility: CLINIC | Age: 55
Discharge: HOME | End: 2020-04-28
Attending: PHYSICIAN ASSISTANT | Admitting: PHYSICIAN ASSISTANT
Payer: COMMERCIAL

## 2020-04-28 ENCOUNTER — MEDICAL CORRESPONDENCE (OUTPATIENT)
Dept: HEALTH INFORMATION MANAGEMENT | Facility: CLINIC | Age: 55
End: 2020-04-28

## 2020-04-28 ENCOUNTER — HOME INFUSION (PRE-WILLOW HOME INFUSION) (OUTPATIENT)
Dept: PHARMACY | Facility: CLINIC | Age: 55
End: 2020-04-28

## 2020-04-28 VITALS
TEMPERATURE: 96.4 F | RESPIRATION RATE: 16 BRPM | OXYGEN SATURATION: 98 % | HEART RATE: 74 BPM | DIASTOLIC BLOOD PRESSURE: 74 MMHG | SYSTOLIC BLOOD PRESSURE: 124 MMHG

## 2020-04-28 DIAGNOSIS — D75.839 THROMBOCYTOSIS: ICD-10-CM

## 2020-04-28 LAB
ALBUMIN SERPL-MCNC: 3.2 G/DL (ref 3.4–5)
ALP SERPL-CCNC: 130 U/L (ref 40–150)
ALT SERPL W P-5'-P-CCNC: 47 U/L (ref 0–50)
ANION GAP SERPL CALCULATED.3IONS-SCNC: 5 MMOL/L (ref 3–14)
AST SERPL W P-5'-P-CCNC: 26 U/L (ref 0–45)
AST SERPL W P-5'-P-CCNC: 28 U/L (ref 0–45)
BACTERIA SPEC CULT: NORMAL
BASOPHILS # BLD AUTO: 0.2 10E9/L (ref 0–0.2)
BASOPHILS # BLD AUTO: 0.2 10E9/L (ref 0–0.2)
BASOPHILS NFR BLD AUTO: 1.5 %
BASOPHILS NFR BLD AUTO: 1.7 %
BILIRUB SERPL-MCNC: 0.3 MG/DL (ref 0.2–1.3)
BUN SERPL-MCNC: 17 MG/DL (ref 7–30)
BUN SERPL-MCNC: 18 MG/DL (ref 7–30)
CALCIUM SERPL-MCNC: 9 MG/DL (ref 8.5–10.1)
CHLORIDE SERPL-SCNC: 105 MMOL/L (ref 94–109)
CO2 SERPL-SCNC: 26 MMOL/L (ref 20–32)
CREAT SERPL-MCNC: 0.9 MG/DL (ref 0.52–1.04)
CREAT SERPL-MCNC: 0.91 MG/DL (ref 0.52–1.04)
CRP SERPL-MCNC: 13.7 MG/L (ref 0–8)
DIFFERENTIAL METHOD BLD: ABNORMAL
DIFFERENTIAL METHOD BLD: ABNORMAL
EOSINOPHIL # BLD AUTO: 0.2 10E9/L (ref 0–0.7)
EOSINOPHIL # BLD AUTO: 0.2 10E9/L (ref 0–0.7)
EOSINOPHIL NFR BLD AUTO: 1.9 %
EOSINOPHIL NFR BLD AUTO: 2 %
ERYTHROCYTE [DISTWIDTH] IN BLOOD BY AUTOMATED COUNT: 13.2 % (ref 10–15)
ERYTHROCYTE [DISTWIDTH] IN BLOOD BY AUTOMATED COUNT: 13.2 % (ref 10–15)
ERYTHROCYTE [SEDIMENTATION RATE] IN BLOOD BY WESTERGREN METHOD: 71 MM/H (ref 0–30)
GFR SERPL CREATININE-BSD FRML MDRD: 71 ML/MIN/{1.73_M2}
GFR SERPL CREATININE-BSD FRML MDRD: 72 ML/MIN/{1.73_M2}
GLUCOSE SERPL-MCNC: 132 MG/DL (ref 70–99)
HCT VFR BLD AUTO: 37.7 % (ref 35–47)
HCT VFR BLD AUTO: 39.7 % (ref 35–47)
HGB BLD-MCNC: 11.8 G/DL (ref 11.7–15.7)
HGB BLD-MCNC: 12.7 G/DL (ref 11.7–15.7)
IMM GRANULOCYTES # BLD: 0 10E9/L (ref 0–0.4)
IMM GRANULOCYTES # BLD: 0 10E9/L (ref 0–0.4)
IMM GRANULOCYTES NFR BLD: 0.3 %
IMM GRANULOCYTES NFR BLD: 0.4 %
LYMPHOCYTES # BLD AUTO: 3.3 10E9/L (ref 0.8–5.3)
LYMPHOCYTES # BLD AUTO: 3.6 10E9/L (ref 0.8–5.3)
LYMPHOCYTES NFR BLD AUTO: 29.6 %
LYMPHOCYTES NFR BLD AUTO: 32.2 %
Lab: NORMAL
MCH RBC QN AUTO: 29 PG (ref 26.5–33)
MCH RBC QN AUTO: 29.5 PG (ref 26.5–33)
MCHC RBC AUTO-ENTMCNC: 31.3 G/DL (ref 31.5–36.5)
MCHC RBC AUTO-ENTMCNC: 32 G/DL (ref 31.5–36.5)
MCV RBC AUTO: 92 FL (ref 78–100)
MCV RBC AUTO: 93 FL (ref 78–100)
MONOCYTES # BLD AUTO: 0.6 10E9/L (ref 0–1.3)
MONOCYTES # BLD AUTO: 0.7 10E9/L (ref 0–1.3)
MONOCYTES NFR BLD AUTO: 5 %
MONOCYTES NFR BLD AUTO: 6.1 %
NEUTROPHILS # BLD AUTO: 6.5 10E9/L (ref 1.6–8.3)
NEUTROPHILS # BLD AUTO: 6.8 10E9/L (ref 1.6–8.3)
NEUTROPHILS NFR BLD AUTO: 57.7 %
NEUTROPHILS NFR BLD AUTO: 61.6 %
NRBC # BLD AUTO: 0 10*3/UL
NRBC # BLD AUTO: 0 10*3/UL
NRBC BLD AUTO-RTO: 0 /100
NRBC BLD AUTO-RTO: 0 /100
PLATELET # BLD AUTO: 1090 10E9/L (ref 150–450)
PLATELET # BLD AUTO: 857 10E9/L (ref 150–450)
PLATELET # BLD EST: ABNORMAL 10*3/UL
POTASSIUM SERPL-SCNC: 4.2 MMOL/L (ref 3.4–5.3)
PROT SERPL-MCNC: 7.6 G/DL (ref 6.8–8.8)
RBC # BLD AUTO: 4.07 10E12/L (ref 3.8–5.2)
RBC # BLD AUTO: 4.31 10E12/L (ref 3.8–5.2)
RBC MORPH BLD: ABNORMAL
SODIUM SERPL-SCNC: 136 MMOL/L (ref 133–144)
SPECIMEN SOURCE: NORMAL
WBC # BLD AUTO: 11.1 10E9/L (ref 4–11)
WBC # BLD AUTO: 11.2 10E9/L (ref 4–11)

## 2020-04-28 PROCEDURE — 85025 COMPLETE CBC W/AUTO DIFF WBC: CPT | Performed by: INTERNAL MEDICINE

## 2020-04-28 PROCEDURE — 84520 ASSAY OF UREA NITROGEN: CPT | Performed by: INTERNAL MEDICINE

## 2020-04-28 PROCEDURE — 80053 COMPREHEN METABOLIC PANEL: CPT | Performed by: PHYSICIAN ASSISTANT

## 2020-04-28 PROCEDURE — 84450 TRANSFERASE (AST) (SGOT): CPT | Performed by: INTERNAL MEDICINE

## 2020-04-28 PROCEDURE — 85652 RBC SED RATE AUTOMATED: CPT | Performed by: INTERNAL MEDICINE

## 2020-04-28 PROCEDURE — 99283 EMERGENCY DEPT VISIT LOW MDM: CPT

## 2020-04-28 PROCEDURE — 85025 COMPLETE CBC W/AUTO DIFF WBC: CPT | Performed by: PHYSICIAN ASSISTANT

## 2020-04-28 PROCEDURE — 82565 ASSAY OF CREATININE: CPT | Performed by: INTERNAL MEDICINE

## 2020-04-28 PROCEDURE — 86140 C-REACTIVE PROTEIN: CPT | Performed by: INTERNAL MEDICINE

## 2020-04-28 ASSESSMENT — ENCOUNTER SYMPTOMS
HEADACHES: 1
FATIGUE: 1
NAUSEA: 0
ABDOMINAL PAIN: 0
VOMITING: 0

## 2020-04-28 NOTE — ED AVS SNAPSHOT
[facility] Emergency Department  [address]  [facility] [address]  Phone:  [phone]  Fax:  [phone]                                    Annemarie Prajapati   MRN: [id number]    Department:  [facility] Emergency Department   Date of Visit:  4/28/2020           After Visit Summary Signature Page    I have received my discharge instructions, and my questions have been answered. I have discussed any challenges I see with this plan with the nurse or doctor.    ..........................................................................................................................................  Patient/Patient Representative Signature      ..........................................................................................................................................  Patient Representative Print Name and Relationship to Patient    ..................................................               ................................................  Date                                   Time    ..........................................................................................................................................  Reviewed by Signature/Title    ...................................................              ..............................................  Date                                               Time          22EPIC Rev 08/18

## 2020-04-28 NOTE — ED TRIAGE NOTES
Patient presents with complaints of platelet count of 1090. Patient states that labs where riki today.  ABC intact without need for intervention at this time.

## 2020-04-28 NOTE — DISCHARGE INSTRUCTIONS
Call your outpatient providers and see if they are able to have your home health care nurse recheck your platelets by the end of the week to monitor this.  Return if you feel like your symptoms are worsening, you have increasing lower extremity edema, chest pain, shortness of breath, new concerns.  The rest your labs are reassuring today, I would continue to monitor.

## 2020-04-28 NOTE — ED PROVIDER NOTES
History     Chief Complaint:  Abnormal Labs       HPI   Annemarie Prajapati is a 55 year old female who presents with abnormal labs. The patient recently had left knee surgery for septic joint 2 weeks ago. She states that she has home health services and has her blood drawn every week. The patient had her blood drawn this morning and was told her platelet level was 1090. Due to her high platelet level she was told to come into the ER for evaluation. Here, she also notes having a headache and feeling worn out today. She states that her left knee is healing well and has been using tylenol, tramadol, and trazodone at home for her pain. She denies any nausea, vomiting, or abdominal pain.      Allergies:  No Known Allergies     Medications:    wellbutrin   Hydroxyzine   Trazodone   Tramadol     Past Medical History:    Depression   Migraine     Past Surgical History:    Arthroplasty knee bilateral   [place] teeth extraction     Family History:    Mother: diabetes, coronary artery disease, thyroid disease  Father: coronary artery disease, hypertension, hyperlipidemia, prostate cancer     Social History:  The patient presents alone.  Smoking Status: Never  Smokeless Tobacco: Not on file  Alcohol Use: Yes  Drug Use: No  Marital Status:       Review of Systems   Constitutional: Positive for fatigue.   Gastrointestinal: Negative for abdominal pain, nausea and vomiting.   Neurological: Positive for headaches.   All other systems reviewed and are negative.      Physical Exam     Patient Vitals for the past 24 hrs:   BP Temp Pulse Resp SpO2   04/28/20 1900 124/74 -- 74 16 98 %   04/28/20 1845 124/84 -- 73 -- --   04/28/20 1830 123/87 -- 74 -- --   04/28/20 1815 115/78 -- 78 -- --   04/28/20 1800 134/78 -- 88 -- --   04/28/20 1753 -- 96.4  F (35.8  C) 83 18 96 %      Physical Exam  General: Well appearing, well nourished. Normal mood and affect.  Skin: Healing surgical incision on the left knee.  Mild surrounding edema with  no obvious erythema or warmth.  No drainage.  HEENT: Head: Normocephalic, atraumatic, no visible masses.   Eyes: Conjunctiva clear.  Cardiac: Normal rate and regular rhythm, no murmur or gallop.   Lungs: Clear to auscultation.  Abdomen: Abdomen soft, non-tender. No rebound tenderness of guarding.   Musculoskeletal: Normal gait and station. No calf tenderness or swelling.   Neurologic: Oriented x 3. GCS: 15.  Psychiatric: Intact recent and remote memory, judgment and insight, normal mood and affect.     Emergency Department Course     Laboratory:  Laboratory findings were communicated with the patient who voiced understanding of the findings.    CBC:  WBC 11.2 (H), HGB 11.8,  (H), o/w WNL     CMP: Glucose 132 (H), albumin 3.2 (L), o/w WNL (Creatinine: 0.91)    Emergency Department Course:  Past medical records, nursing notes, and vitals reviewed.    1808 I performed an exam of the patient as documented above.    IV was inserted and blood was drawn for laboratory testing, results above.      1855 Patient rechecked and updated.       Findings and plan explained to the Patient. Patient discharged home with instructions regarding supportive care, medications, and reasons to return. The importance of close follow-up was reviewed.      Impression & Plan     Medical Decision Making:  Annemarie Prajapati is a 55 year old female who presents to the emergency department today with abnormal labs.  Details the patient's history can be known the HPI.  Differentials for thrombocytosis were considered included reactive causes (infection, anemia/blood loss, malignancy, splenectomy), autoimmune thrombocytosis, polycythemia vera, CML, AML, amongst others.  Labs were redrawn here, which did show thrombocytosis at 857.  At this point, as patient is asymptomatic, I do not feel that additional investigation needs to be completed.  She has had a recent surgery, with blood loss, which I suspect is a reactive cause of her pain.  I  advised her to continue monitoring this at home.  She will call her  provider tomorrow and see if her home nurse can come later in the week to redraw her platelets, and continue to monitor this.  If she were to develop any symptoms in the meantime including peripheral edema, chest pain, shortness of breath, she will return for further evaluation.  All questions were answered prior to discharge.  She is in agreement with the treatment plan as stated above.    Discharge Diagnosis:    ICD-10-CM    1. Thrombocytosis (H)  D47.3        Disposition:  Discharged to home.    Scribe Disclosure:  ITeddy, am serving as a scribe at 6:08 PM on 4/28/2020 to document services personally performed by Eva Garcia PA based on my observations and the provider's statements to me.      4/28/2020   Eva Garcia PA      This was created at least in part with a voice recognition software. Mistakes/typos may be present.      Eva Garcia PA  04/28/20 2019

## 2020-04-29 ENCOUNTER — HOME INFUSION (PRE-WILLOW HOME INFUSION) (OUTPATIENT)
Dept: PHARMACY | Facility: CLINIC | Age: 55
End: 2020-04-29

## 2020-04-29 NOTE — PROGRESS NOTES
This is a recent snapshot of the patient's [facility] medical record.  For current drug dose and complete information and questions, call [phone] or In Basket pool, [facility] home infusion ([id number])  CSN Number:  [id number]

## 2020-04-30 LAB
AST SERPL W P-5'-P-CCNC: 26 U/L (ref 0–45)
BASOPHILS # BLD AUTO: 0.1 10E9/L (ref 0–0.2)
BASOPHILS NFR BLD AUTO: 1.7 %
BUN SERPL-MCNC: 17 MG/DL (ref 7–30)
CREAT SERPL-MCNC: 0.82 MG/DL (ref 0.52–1.04)
CRP SERPL-MCNC: 11 MG/L (ref 0–8)
DIFFERENTIAL METHOD BLD: ABNORMAL
EOSINOPHIL # BLD AUTO: 0.2 10E9/L (ref 0–0.7)
EOSINOPHIL NFR BLD AUTO: 2.3 %
ERYTHROCYTE [DISTWIDTH] IN BLOOD BY AUTOMATED COUNT: 13.4 % (ref 10–15)
ERYTHROCYTE [SEDIMENTATION RATE] IN BLOOD BY WESTERGREN METHOD: 38 MM/H (ref 0–30)
GFR SERPL CREATININE-BSD FRML MDRD: 82 ML/MIN/{1.73_M2}
HCT VFR BLD AUTO: 39.2 % (ref 35–47)
HGB BLD-MCNC: 12.4 G/DL (ref 11.7–15.7)
IMM GRANULOCYTES # BLD: 0 10E9/L (ref 0–0.4)
IMM GRANULOCYTES NFR BLD: 0.4 %
LYMPHOCYTES # BLD AUTO: 2.7 10E9/L (ref 0.8–5.3)
LYMPHOCYTES NFR BLD AUTO: 32.6 %
MCH RBC QN AUTO: 29 PG (ref 26.5–33)
MCHC RBC AUTO-ENTMCNC: 31.6 G/DL (ref 31.5–36.5)
MCV RBC AUTO: 92 FL (ref 78–100)
MONOCYTES # BLD AUTO: 0.5 10E9/L (ref 0–1.3)
MONOCYTES NFR BLD AUTO: 5.5 %
NEUTROPHILS # BLD AUTO: 4.8 10E9/L (ref 1.6–8.3)
NEUTROPHILS NFR BLD AUTO: 57.5 %
NRBC # BLD AUTO: 0 10*3/UL
NRBC BLD AUTO-RTO: 0 /100
PLATELET # BLD AUTO: 846 10E9/L (ref 150–450)
RBC # BLD AUTO: 4.27 10E12/L (ref 3.8–5.2)
WBC # BLD AUTO: 8.3 10E9/L (ref 4–11)

## 2020-04-30 PROCEDURE — 86140 C-REACTIVE PROTEIN: CPT | Performed by: INTERNAL MEDICINE

## 2020-04-30 PROCEDURE — 82565 ASSAY OF CREATININE: CPT | Performed by: INTERNAL MEDICINE

## 2020-04-30 PROCEDURE — 84450 TRANSFERASE (AST) (SGOT): CPT | Performed by: INTERNAL MEDICINE

## 2020-04-30 PROCEDURE — 85652 RBC SED RATE AUTOMATED: CPT | Performed by: INTERNAL MEDICINE

## 2020-04-30 PROCEDURE — 84520 ASSAY OF UREA NITROGEN: CPT | Performed by: INTERNAL MEDICINE

## 2020-04-30 PROCEDURE — 85025 COMPLETE CBC W/AUTO DIFF WBC: CPT | Performed by: INTERNAL MEDICINE

## 2020-04-30 NOTE — PROGRESS NOTES
This is a recent snapshot of the patient's [facility] medical record.  For current drug dose and complete information and questions, call [phone] or In Basket pool, fv home infusion ([id number])  CSN Number:  [id number]

## 2020-05-01 ENCOUNTER — HOME INFUSION (PRE-WILLOW HOME INFUSION) (OUTPATIENT)
Dept: PHARMACY | Facility: CLINIC | Age: 55
End: 2020-05-01

## 2020-05-02 ENCOUNTER — HOME INFUSION (PRE-WILLOW HOME INFUSION) (OUTPATIENT)
Dept: PHARMACY | Facility: CLINIC | Age: 55
End: 2020-05-02

## 2020-05-04 NOTE — PROGRESS NOTES
This is a recent snapshot of the patient's [facility] medical record.  For current drug dose and complete information and questions, call [phone] or In Basket pool, fv home infusion ([id number])  CSN Number:  [id number]

## 2020-05-05 ENCOUNTER — HOME INFUSION (PRE-WILLOW HOME INFUSION) (OUTPATIENT)
Dept: PHARMACY | Facility: CLINIC | Age: 55
End: 2020-05-05

## 2020-05-05 LAB
AST SERPL W P-5'-P-CCNC: 19 U/L (ref 0–45)
BASOPHILS # BLD AUTO: 0.1 10E9/L (ref 0–0.2)
BASOPHILS NFR BLD AUTO: 1.1 %
BUN SERPL-MCNC: 15 MG/DL (ref 7–30)
CREAT SERPL-MCNC: 0.87 MG/DL (ref 0.52–1.04)
CRP SERPL-MCNC: 7.9 MG/L (ref 0–8)
DIFFERENTIAL METHOD BLD: ABNORMAL
EOSINOPHIL # BLD AUTO: 0.2 10E9/L (ref 0–0.7)
EOSINOPHIL NFR BLD AUTO: 1.9 %
ERYTHROCYTE [DISTWIDTH] IN BLOOD BY AUTOMATED COUNT: 13.2 % (ref 10–15)
ERYTHROCYTE [SEDIMENTATION RATE] IN BLOOD BY WESTERGREN METHOD: 18 MM/H (ref 0–30)
GFR SERPL CREATININE-BSD FRML MDRD: 75 ML/MIN/{1.73_M2}
HCT VFR BLD AUTO: 39.5 % (ref 35–47)
HGB BLD-MCNC: 12.7 G/DL (ref 11.7–15.7)
IMM GRANULOCYTES # BLD: 0 10E9/L (ref 0–0.4)
IMM GRANULOCYTES NFR BLD: 0.2 %
LYMPHOCYTES # BLD AUTO: 2.2 10E9/L (ref 0.8–5.3)
LYMPHOCYTES NFR BLD AUTO: 25.3 %
MCH RBC QN AUTO: 29.3 PG (ref 26.5–33)
MCHC RBC AUTO-ENTMCNC: 32.2 G/DL (ref 31.5–36.5)
MCV RBC AUTO: 91 FL (ref 78–100)
MONOCYTES # BLD AUTO: 0.6 10E9/L (ref 0–1.3)
MONOCYTES NFR BLD AUTO: 6.4 %
NEUTROPHILS # BLD AUTO: 5.7 10E9/L (ref 1.6–8.3)
NEUTROPHILS NFR BLD AUTO: 65.1 %
NRBC # BLD AUTO: 0 10*3/UL
NRBC BLD AUTO-RTO: 0 /100
PLATELET # BLD AUTO: 498 10E9/L (ref 150–450)
RBC # BLD AUTO: 4.34 10E12/L (ref 3.8–5.2)
WBC # BLD AUTO: 8.8 10E9/L (ref 4–11)

## 2020-05-05 PROCEDURE — 85652 RBC SED RATE AUTOMATED: CPT | Performed by: INTERNAL MEDICINE

## 2020-05-05 PROCEDURE — 85025 COMPLETE CBC W/AUTO DIFF WBC: CPT | Performed by: INTERNAL MEDICINE

## 2020-05-05 PROCEDURE — 86140 C-REACTIVE PROTEIN: CPT | Performed by: INTERNAL MEDICINE

## 2020-05-05 PROCEDURE — 84520 ASSAY OF UREA NITROGEN: CPT | Performed by: INTERNAL MEDICINE

## 2020-05-05 PROCEDURE — 84450 TRANSFERASE (AST) (SGOT): CPT | Performed by: INTERNAL MEDICINE

## 2020-05-05 PROCEDURE — 82565 ASSAY OF CREATININE: CPT | Performed by: INTERNAL MEDICINE

## 2020-05-05 NOTE — PROGRESS NOTES
This is a recent snapshot of the patient's [facility] medical record.  For current drug dose and complete information and questions, call [phone] or In [facility] pool, fv home infusion ([id number])  CSN Number:  [id number]

## 2020-05-06 NOTE — PROGRESS NOTES
This is a recent snapshot of the patient's [facility] medical record.  For current drug dose and complete information and questions, call [phone] or In Basket pool, fv home infusion ([id number])  CSN Number:  [id number]

## 2020-05-12 LAB
AST SERPL W P-5'-P-CCNC: 19 U/L (ref 0–45)
BASOPHILS # BLD AUTO: 0.1 10E9/L (ref 0–0.2)
BASOPHILS NFR BLD AUTO: 1.3 %
CREAT SERPL-MCNC: 0.74 MG/DL (ref 0.52–1.04)
CRP SERPL-MCNC: 5.6 MG/L (ref 0–8)
DIFFERENTIAL METHOD BLD: ABNORMAL
EOSINOPHIL # BLD AUTO: 0.2 10E9/L (ref 0–0.7)
EOSINOPHIL NFR BLD AUTO: 3.7 %
ERYTHROCYTE [DISTWIDTH] IN BLOOD BY AUTOMATED COUNT: 13.5 % (ref 10–15)
ERYTHROCYTE [SEDIMENTATION RATE] IN BLOOD BY WESTERGREN METHOD: 12 MM/H (ref 0–30)
GFR SERPL CREATININE-BSD FRML MDRD: >90 ML/MIN/{1.73_M2}
GLUCOSE SERPL-MCNC: 126 MG/DL (ref 70–99)
HCT VFR BLD AUTO: 39.8 % (ref 35–47)
HGB BLD-MCNC: 12.5 G/DL (ref 11.7–15.7)
IMM GRANULOCYTES # BLD: 0 10E9/L (ref 0–0.4)
IMM GRANULOCYTES NFR BLD: 0.2 %
LYMPHOCYTES # BLD AUTO: 2.6 10E9/L (ref 0.8–5.3)
LYMPHOCYTES NFR BLD AUTO: 41.3 %
MCH RBC QN AUTO: 29 PG (ref 26.5–33)
MCHC RBC AUTO-ENTMCNC: 31.4 G/DL (ref 31.5–36.5)
MCV RBC AUTO: 92 FL (ref 78–100)
MONOCYTES # BLD AUTO: 0.4 10E9/L (ref 0–1.3)
MONOCYTES NFR BLD AUTO: 6.4 %
NEUTROPHILS # BLD AUTO: 2.9 10E9/L (ref 1.6–8.3)
NEUTROPHILS NFR BLD AUTO: 47.1 %
NRBC # BLD AUTO: 0 10*3/UL
NRBC BLD AUTO-RTO: 0 /100
PLATELET # BLD AUTO: 336 10E9/L (ref 150–450)
RBC # BLD AUTO: 4.31 10E12/L (ref 3.8–5.2)
WBC # BLD AUTO: 6.2 10E9/L (ref 4–11)

## 2020-05-12 PROCEDURE — 86140 C-REACTIVE PROTEIN: CPT | Performed by: INTERNAL MEDICINE

## 2020-05-12 PROCEDURE — 82947 ASSAY GLUCOSE BLOOD QUANT: CPT | Performed by: INTERNAL MEDICINE

## 2020-05-12 PROCEDURE — 85652 RBC SED RATE AUTOMATED: CPT | Performed by: INTERNAL MEDICINE

## 2020-05-12 PROCEDURE — 84450 TRANSFERASE (AST) (SGOT): CPT | Performed by: INTERNAL MEDICINE

## 2020-05-12 PROCEDURE — 82565 ASSAY OF CREATININE: CPT | Performed by: INTERNAL MEDICINE

## 2020-05-12 PROCEDURE — 85025 COMPLETE CBC W/AUTO DIFF WBC: CPT | Performed by: INTERNAL MEDICINE

## 2020-05-15 ENCOUNTER — HOME INFUSION (PRE-WILLOW HOME INFUSION) (OUTPATIENT)
Dept: PHARMACY | Facility: CLINIC | Age: 55
End: 2020-05-15

## 2020-05-18 NOTE — PROGRESS NOTES
This is a recent snapshot of the patient's [facility] medical record.  For current drug dose and complete information and questions, call [phone] or In Basket pool, fv home infusion ([id number])  CSN Number:  [id number]

## 2020-05-19 LAB
AST SERPL W P-5'-P-CCNC: 17 U/L (ref 0–45)
BASOPHILS # BLD AUTO: 0.1 10E9/L (ref 0–0.2)
BASOPHILS NFR BLD AUTO: 0.8 %
BUN SERPL-MCNC: 19 MG/DL (ref 7–30)
CREAT SERPL-MCNC: 0.77 MG/DL (ref 0.52–1.04)
CRP SERPL-MCNC: 4.7 MG/L (ref 0–8)
DIFFERENTIAL METHOD BLD: NORMAL
EOSINOPHIL # BLD AUTO: 0.2 10E9/L (ref 0–0.7)
EOSINOPHIL NFR BLD AUTO: 2.3 %
ERYTHROCYTE [DISTWIDTH] IN BLOOD BY AUTOMATED COUNT: 14 % (ref 10–15)
ERYTHROCYTE [SEDIMENTATION RATE] IN BLOOD BY WESTERGREN METHOD: 8 MM/H (ref 0–30)
GFR SERPL CREATININE-BSD FRML MDRD: 88 ML/MIN/{1.73_M2}
HCT VFR BLD AUTO: 39.1 % (ref 35–47)
HGB BLD-MCNC: 12.6 G/DL (ref 11.7–15.7)
IMM GRANULOCYTES # BLD: 0 10E9/L (ref 0–0.4)
IMM GRANULOCYTES NFR BLD: 0.2 %
LYMPHOCYTES # BLD AUTO: 2 10E9/L (ref 0.8–5.3)
LYMPHOCYTES NFR BLD AUTO: 23.2 %
MCH RBC QN AUTO: 29.6 PG (ref 26.5–33)
MCHC RBC AUTO-ENTMCNC: 32.2 G/DL (ref 31.5–36.5)
MCV RBC AUTO: 92 FL (ref 78–100)
MONOCYTES # BLD AUTO: 0.4 10E9/L (ref 0–1.3)
MONOCYTES NFR BLD AUTO: 5 %
NEUTROPHILS # BLD AUTO: 5.9 10E9/L (ref 1.6–8.3)
NEUTROPHILS NFR BLD AUTO: 68.5 %
NRBC # BLD AUTO: 0 10*3/UL
NRBC BLD AUTO-RTO: 0 /100
PLATELET # BLD AUTO: 312 10E9/L (ref 150–450)
RBC # BLD AUTO: 4.25 10E12/L (ref 3.8–5.2)
WBC # BLD AUTO: 8.7 10E9/L (ref 4–11)

## 2020-05-19 PROCEDURE — 84450 TRANSFERASE (AST) (SGOT): CPT | Performed by: INTERNAL MEDICINE

## 2020-05-19 PROCEDURE — 86140 C-REACTIVE PROTEIN: CPT | Performed by: INTERNAL MEDICINE

## 2020-05-19 PROCEDURE — 82565 ASSAY OF CREATININE: CPT | Performed by: INTERNAL MEDICINE

## 2020-05-19 PROCEDURE — 85652 RBC SED RATE AUTOMATED: CPT | Performed by: INTERNAL MEDICINE

## 2020-05-19 PROCEDURE — 84520 ASSAY OF UREA NITROGEN: CPT | Performed by: INTERNAL MEDICINE

## 2020-05-19 PROCEDURE — 85025 COMPLETE CBC W/AUTO DIFF WBC: CPT | Performed by: INTERNAL MEDICINE

## 2020-05-22 ENCOUNTER — HOME INFUSION (PRE-WILLOW HOME INFUSION) (OUTPATIENT)
Dept: PHARMACY | Facility: CLINIC | Age: 55
End: 2020-05-22

## 2020-05-26 ENCOUNTER — MEDICAL CORRESPONDENCE (OUTPATIENT)
Dept: HEALTH INFORMATION MANAGEMENT | Facility: CLINIC | Age: 55
End: 2020-05-26

## 2020-05-26 LAB
AST SERPL W P-5'-P-CCNC: 17 U/L (ref 0–45)
BASOPHILS # BLD AUTO: 0.1 10E9/L (ref 0–0.2)
BASOPHILS NFR BLD AUTO: 1.3 %
BUN SERPL-MCNC: 12 MG/DL (ref 7–30)
CREAT SERPL-MCNC: 0.68 MG/DL (ref 0.52–1.04)
CRP SERPL-MCNC: 6.4 MG/L (ref 0–8)
DIFFERENTIAL METHOD BLD: NORMAL
EOSINOPHIL # BLD AUTO: 0.1 10E9/L (ref 0–0.7)
EOSINOPHIL NFR BLD AUTO: 1.7 %
ERYTHROCYTE [DISTWIDTH] IN BLOOD BY AUTOMATED COUNT: 13.9 % (ref 10–15)
ERYTHROCYTE [SEDIMENTATION RATE] IN BLOOD BY WESTERGREN METHOD: 9 MM/H (ref 0–30)
GFR SERPL CREATININE-BSD FRML MDRD: >90 ML/MIN/{1.73_M2}
HCT VFR BLD AUTO: 38.6 % (ref 35–47)
HGB BLD-MCNC: 12.3 G/DL (ref 11.7–15.7)
IMM GRANULOCYTES # BLD: 0 10E9/L (ref 0–0.4)
IMM GRANULOCYTES NFR BLD: 0.3 %
LYMPHOCYTES # BLD AUTO: 2.3 10E9/L (ref 0.8–5.3)
LYMPHOCYTES NFR BLD AUTO: 32.9 %
MCH RBC QN AUTO: 29.1 PG (ref 26.5–33)
MCHC RBC AUTO-ENTMCNC: 31.9 G/DL (ref 31.5–36.5)
MCV RBC AUTO: 92 FL (ref 78–100)
MONOCYTES # BLD AUTO: 0.3 10E9/L (ref 0–1.3)
MONOCYTES NFR BLD AUTO: 4.7 %
NEUTROPHILS # BLD AUTO: 4.1 10E9/L (ref 1.6–8.3)
NEUTROPHILS NFR BLD AUTO: 59.1 %
NRBC # BLD AUTO: 0 10*3/UL
NRBC BLD AUTO-RTO: 0 /100
PLATELET # BLD AUTO: 375 10E9/L (ref 150–450)
RBC # BLD AUTO: 4.22 10E12/L (ref 3.8–5.2)
WBC # BLD AUTO: 7 10E9/L (ref 4–11)

## 2020-05-26 PROCEDURE — 86140 C-REACTIVE PROTEIN: CPT | Performed by: INTERNAL MEDICINE

## 2020-05-26 PROCEDURE — 85025 COMPLETE CBC W/AUTO DIFF WBC: CPT | Performed by: INTERNAL MEDICINE

## 2020-05-26 PROCEDURE — 85652 RBC SED RATE AUTOMATED: CPT | Performed by: INTERNAL MEDICINE

## 2020-05-26 PROCEDURE — 84450 TRANSFERASE (AST) (SGOT): CPT | Performed by: INTERNAL MEDICINE

## 2020-05-26 PROCEDURE — 84520 ASSAY OF UREA NITROGEN: CPT | Performed by: INTERNAL MEDICINE

## 2020-05-26 PROCEDURE — 82565 ASSAY OF CREATININE: CPT | Performed by: INTERNAL MEDICINE

## 2020-05-26 NOTE — PROGRESS NOTES
This is a recent snapshot of the patient's [facility] medical record.  For current drug dose and complete information and questions, call [phone] or In Basket pool, fv home infusion ([id number])  CSN Number:  [id number]

## 2020-05-27 ENCOUNTER — HOME INFUSION (PRE-WILLOW HOME INFUSION) (OUTPATIENT)
Dept: PHARMACY | Facility: CLINIC | Age: 55
End: 2020-05-27

## 2020-05-28 NOTE — PROGRESS NOTES
This is a recent snapshot of the patient's [facility] medical record.  For current drug dose and complete information and questions, call [phone] or In Basket pool, fv home infusion ([id number])  CSN Number:  [id number]

## 2020-06-01 ENCOUNTER — APPOINTMENT (OUTPATIENT)
Dept: LAB | Facility: CLINIC | Age: 55
End: 2020-06-01
Attending: INTERNAL MEDICINE
Payer: COMMERCIAL

## 2021-01-15 ENCOUNTER — HEALTH MAINTENANCE LETTER (OUTPATIENT)
Age: 56
End: 2021-01-15

## 2021-09-05 ENCOUNTER — HEALTH MAINTENANCE LETTER (OUTPATIENT)
Age: 56
End: 2021-09-05

## 2022-02-20 ENCOUNTER — HEALTH MAINTENANCE LETTER (OUTPATIENT)
Age: 57
End: 2022-02-20

## 2022-04-17 ENCOUNTER — HEALTH MAINTENANCE LETTER (OUTPATIENT)
Age: 57
End: 2022-04-17

## 2022-10-23 ENCOUNTER — HEALTH MAINTENANCE LETTER (OUTPATIENT)
Age: 57
End: 2022-10-23

## 2023-04-02 ENCOUNTER — HEALTH MAINTENANCE LETTER (OUTPATIENT)
Age: 58
End: 2023-04-02

## (undated) DEVICE — LINEN DRAPE 54X72" 5467

## (undated) DEVICE — LINEN ORTHO ACL PACK 5447

## (undated) DEVICE — GLOVE PROTEXIS POWDER FREE 7.0 ORTHOPEDIC 2D73ET70

## (undated) DEVICE — SUTURE VICRYL+ 0 CT-1 18" DYED VIO VCP740D

## (undated) DEVICE — CAST PADDING 6" STERILE 9046S

## (undated) DEVICE — PREP CHLORAPREP 26ML TINTED ORANGE  260815

## (undated) DEVICE — GLOVE PROTEXIS POWDER FREE 7.5 ORTHOPEDIC 2D73ET75

## (undated) DEVICE — GLOVE PROTEXIS BLUE W/NEU-THERA 8.0  2D73EB80

## (undated) DEVICE — HOOD FLYTE W/PEELAWAY 408-800-100

## (undated) DEVICE — BAG CLEAR TRASH 1.3M 39X33" P4040C

## (undated) DEVICE — DRAIN HEMOVAC RESERVOIR KIT 10FR 1/8" MED 00-2550-002-10

## (undated) DEVICE — DRSG AQUACEL AG 3.5X9.75" HYDROFIBER 412011

## (undated) DEVICE — SOL NACL 0.9% IRRIG 3000ML BAG 2B7477

## (undated) DEVICE — PACK TOTAL KNEE BOXED LATEX FREE PO15TKFCT

## (undated) DEVICE — DRAPE STERI U 1015

## (undated) DEVICE — Device

## (undated) DEVICE — SOL NACL 0.9% IRRIG 1000ML BOTTLE 2F7124

## (undated) DEVICE — SUCTION MANIFOLD NEPTUNE 2 SYS 4 PORT 0702-020-000

## (undated) DEVICE — BACTISURE WOUND LAVAGE

## (undated) DEVICE — GLOVE PROTEXIS BLUE W/NEU-THERA 7.0  2D73EB70

## (undated) DEVICE — GLOVE PROTEXIS POWDER FREE SMT 8.0  2D72PT80X

## (undated) DEVICE — CATH TRAY FOLEY SURESTEP 16FR DRAIN BAG STATOCK A899916

## (undated) DEVICE — TOURNIQUET CUFF 30" REPRO BLUE 60-7070-105

## (undated) DEVICE — LINEN FULL SHEET 5511

## (undated) DEVICE — SU VICRYL+ 1 MO-4 18" DYED VCP702D

## (undated) DEVICE — SUTURE MONOCRYL+ 2-0 CT-1 36" UNDYED MCP945H

## (undated) RX ORDER — PROPOFOL 10 MG/ML
INJECTION, EMULSION INTRAVENOUS
Status: DISPENSED
Start: 2020-04-14

## (undated) RX ORDER — CELECOXIB 200 MG/1
CAPSULE ORAL
Status: DISPENSED
Start: 2020-04-14

## (undated) RX ORDER — HYDROMORPHONE HYDROCHLORIDE 1 MG/ML
INJECTION, SOLUTION INTRAMUSCULAR; INTRAVENOUS; SUBCUTANEOUS
Status: DISPENSED
Start: 2020-04-14

## (undated) RX ORDER — FENTANYL CITRATE 50 UG/ML
INJECTION, SOLUTION INTRAMUSCULAR; INTRAVENOUS
Status: DISPENSED
Start: 2020-04-14

## (undated) RX ORDER — ONDANSETRON 2 MG/ML
INJECTION INTRAMUSCULAR; INTRAVENOUS
Status: DISPENSED
Start: 2020-04-14

## (undated) RX ORDER — PANTOPRAZOLE SODIUM 40 MG/1
TABLET, DELAYED RELEASE ORAL
Status: DISPENSED
Start: 2020-04-14

## (undated) RX ORDER — VANCOMYCIN HYDROCHLORIDE 1 G/20ML
INJECTION, POWDER, LYOPHILIZED, FOR SOLUTION INTRAVENOUS
Status: DISPENSED
Start: 2020-04-14